# Patient Record
Sex: MALE | Race: WHITE | NOT HISPANIC OR LATINO | Employment: OTHER | URBAN - METROPOLITAN AREA
[De-identification: names, ages, dates, MRNs, and addresses within clinical notes are randomized per-mention and may not be internally consistent; named-entity substitution may affect disease eponyms.]

---

## 2023-07-14 ENCOUNTER — APPOINTMENT (OUTPATIENT)
Dept: CT IMAGING | Facility: CLINIC | Age: 63
End: 2023-07-14
Attending: PHYSICIAN ASSISTANT
Payer: COMMERCIAL

## 2023-07-14 ENCOUNTER — HOSPITAL ENCOUNTER (EMERGENCY)
Facility: CLINIC | Age: 63
Discharge: HOME | End: 2023-07-14
Attending: PHYSICIAN ASSISTANT | Admitting: PHYSICIAN ASSISTANT
Payer: COMMERCIAL

## 2023-07-14 VITALS
HEART RATE: 86 BPM | HEIGHT: 72 IN | BODY MASS INDEX: 36.84 KG/M2 | WEIGHT: 272 LBS | SYSTOLIC BLOOD PRESSURE: 145 MMHG | RESPIRATION RATE: 20 BRPM | OXYGEN SATURATION: 97 % | DIASTOLIC BLOOD PRESSURE: 88 MMHG | TEMPERATURE: 98.1 F

## 2023-07-14 DIAGNOSIS — M54.2 NECK PAIN: ICD-10-CM

## 2023-07-14 DIAGNOSIS — D17.0 LIPOMA OF NECK: ICD-10-CM

## 2023-07-14 LAB
ANION GAP SERPL CALCULATED.3IONS-SCNC: 11 MMOL/L (ref 7–15)
BASOPHILS # BLD AUTO: 0.1 10E3/UL (ref 0–0.2)
BASOPHILS NFR BLD AUTO: 1 %
BUN SERPL-MCNC: 18.5 MG/DL (ref 8–23)
CALCIUM SERPL-MCNC: 9.1 MG/DL (ref 8.8–10.2)
CHLORIDE SERPL-SCNC: 104 MMOL/L (ref 98–107)
CREAT SERPL-MCNC: 0.74 MG/DL (ref 0.67–1.17)
DEPRECATED HCO3 PLAS-SCNC: 23 MMOL/L (ref 22–29)
EOSINOPHIL # BLD AUTO: 0.1 10E3/UL (ref 0–0.7)
EOSINOPHIL NFR BLD AUTO: 2 %
ERYTHROCYTE [DISTWIDTH] IN BLOOD BY AUTOMATED COUNT: 15.7 % (ref 10–15)
GFR SERPL CREATININE-BSD FRML MDRD: >90 ML/MIN/1.73M2
GLUCOSE SERPL-MCNC: 119 MG/DL (ref 70–99)
HCT VFR BLD AUTO: 45.2 % (ref 40–53)
HGB BLD-MCNC: 15.2 G/DL (ref 13.3–17.7)
IMM GRANULOCYTES # BLD: 0.1 10E3/UL
IMM GRANULOCYTES NFR BLD: 1 %
LYMPHOCYTES # BLD AUTO: 2.3 10E3/UL (ref 0.8–5.3)
LYMPHOCYTES NFR BLD AUTO: 37 %
MCH RBC QN AUTO: 28.6 PG (ref 26.5–33)
MCHC RBC AUTO-ENTMCNC: 33.6 G/DL (ref 31.5–36.5)
MCV RBC AUTO: 85 FL (ref 78–100)
MONOCYTES # BLD AUTO: 0.7 10E3/UL (ref 0–1.3)
MONOCYTES NFR BLD AUTO: 11 %
NEUTROPHILS # BLD AUTO: 3 10E3/UL (ref 1.6–8.3)
NEUTROPHILS NFR BLD AUTO: 48 %
NRBC # BLD AUTO: 0 10E3/UL
NRBC BLD AUTO-RTO: 0 /100
PLATELET # BLD AUTO: 283 10E3/UL (ref 150–450)
POTASSIUM SERPL-SCNC: 4.4 MMOL/L (ref 3.4–5.3)
RBC # BLD AUTO: 5.31 10E6/UL (ref 4.4–5.9)
SODIUM SERPL-SCNC: 138 MMOL/L (ref 136–145)
WBC # BLD AUTO: 6.3 10E3/UL (ref 4–11)

## 2023-07-14 PROCEDURE — 250N000009 HC RX 250: Performed by: PHYSICIAN ASSISTANT

## 2023-07-14 PROCEDURE — 85025 COMPLETE CBC W/AUTO DIFF WBC: CPT | Performed by: PHYSICIAN ASSISTANT

## 2023-07-14 PROCEDURE — 250N000013 HC RX MED GY IP 250 OP 250 PS 637: Performed by: PHYSICIAN ASSISTANT

## 2023-07-14 PROCEDURE — 71260 CT THORAX DX C+: CPT

## 2023-07-14 PROCEDURE — 99285 EMERGENCY DEPT VISIT HI MDM: CPT | Mod: 25 | Performed by: PHYSICIAN ASSISTANT

## 2023-07-14 PROCEDURE — 250N000011 HC RX IP 250 OP 636: Performed by: PHYSICIAN ASSISTANT

## 2023-07-14 PROCEDURE — 99284 EMERGENCY DEPT VISIT MOD MDM: CPT | Performed by: PHYSICIAN ASSISTANT

## 2023-07-14 PROCEDURE — 70491 CT SOFT TISSUE NECK W/DYE: CPT

## 2023-07-14 PROCEDURE — 80048 BASIC METABOLIC PNL TOTAL CA: CPT | Performed by: PHYSICIAN ASSISTANT

## 2023-07-14 PROCEDURE — 36415 COLL VENOUS BLD VENIPUNCTURE: CPT | Performed by: PHYSICIAN ASSISTANT

## 2023-07-14 RX ORDER — IOPAMIDOL 755 MG/ML
500 INJECTION, SOLUTION INTRAVASCULAR ONCE
Status: COMPLETED | OUTPATIENT
Start: 2023-07-14 | End: 2023-07-14

## 2023-07-14 RX ORDER — OXYCODONE HYDROCHLORIDE 5 MG/1
5 TABLET ORAL EVERY 6 HOURS PRN
Qty: 6 TABLET | Refills: 0 | Status: SHIPPED | OUTPATIENT
Start: 2023-07-14 | End: 2023-07-17

## 2023-07-14 RX ORDER — ACETAMINOPHEN 325 MG/1
975 TABLET ORAL ONCE
Status: COMPLETED | OUTPATIENT
Start: 2023-07-14 | End: 2023-07-14

## 2023-07-14 RX ORDER — CYCLOBENZAPRINE HCL 10 MG
10 TABLET ORAL ONCE
Status: COMPLETED | OUTPATIENT
Start: 2023-07-14 | End: 2023-07-14

## 2023-07-14 RX ORDER — CYCLOBENZAPRINE HCL 10 MG
10 TABLET ORAL 3 TIMES DAILY PRN
Qty: 10 TABLET | Refills: 0 | Status: SHIPPED | OUTPATIENT
Start: 2023-07-14 | End: 2023-07-20

## 2023-07-14 RX ADMIN — SODIUM CHLORIDE 65 ML: 9 INJECTION, SOLUTION INTRAVENOUS at 12:54

## 2023-07-14 RX ADMIN — CYCLOBENZAPRINE 10 MG: 10 TABLET, FILM COATED ORAL at 12:46

## 2023-07-14 RX ADMIN — IOPAMIDOL 100 ML: 755 INJECTION, SOLUTION INTRAVENOUS at 12:54

## 2023-07-14 RX ADMIN — ACETAMINOPHEN 975 MG: 325 TABLET, FILM COATED ORAL at 12:45

## 2023-07-14 ASSESSMENT — ACTIVITIES OF DAILY LIVING (ADL): ADLS_ACUITY_SCORE: 35

## 2023-07-14 NOTE — DISCHARGE INSTRUCTIONS
It was a pleasure working with you today!  I hope your condition improves rapidly!     As we discussed, I would recommend seeing a general surgeon in consultation regarding your lipoma.  They will examine you and review your CT scans to see if it is appropriate for removal at this point.  I placed a referral.  Please call and schedule that appointment.  In the interim, I would use a heating pad over the painful area for 20 minutes every 1-2 hours.  You can use Tylenol 1000 mg every 6 hours as needed for pain.  Try the muscle relaxer 1 tablet every 8 hours as needed.  You can use the oxycodone for severe breakthrough pain.  Do not drive for 8 hours after taking this medication, as it can cause impairment of your judgment.

## 2023-07-14 NOTE — ED TRIAGE NOTES
Reports waking up with right neck pain this morning.      Triage Assessment     Row Name 07/14/23 1156       Triage Assessment (Adult)    Airway WDL WDL       Respiratory WDL    Respiratory WDL WDL       Skin Circulation/Temperature WDL    Skin Circulation/Temperature WDL WDL       Cardiac WDL    Cardiac WDL WDL       Peripheral/Neurovascular WDL    Peripheral Neurovascular WDL WDL       Cognitive/Neuro/Behavioral WDL    Cognitive/Neuro/Behavioral WDL WDL

## 2023-07-14 NOTE — ED PROVIDER NOTES
History     Chief Complaint   Patient presents with     Neck Pain     HPI  You Abbott is a 63 year old male who presents for evaluation of right trapezius and neck discomfort starting this morning when he woke up.  No injury.  Denies any radicular symptoms into his right upper extremity.  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  He was offered a cervical fusion at that level 2 years ago, but decided not to proceed with that.  This pain has not changed at all.  He also has a known lipoma which he states started in the right trapezius area.  He was offered removal by an orthopedic surgeon according to his report in [place], but they said they would have to open him up essentially from the trapezius area all the way down to the right mid chest which scared the patient and he did not proceed.  Patient states that the lipomatous mass has increased in size.  He is concerned that something has ruptured or gone wrong.  His pain is currently rated 7 on a scale of 10.  Worse with any movement of the right upper extremity.  Denies any fevers or chills.  No nausea or vomiting.  No chest pain, dyspnea, cough, or palpitations.  No hemoptysis.    Allergies:  No Known Allergies    Problem List:    There are no problems to display for this patient.       Past Medical History:    No past medical history on file.    Past Surgical History:    No past surgical history on file.    Family History:    No family history on file.    Social History:  Marital Status:  Single [1]        Medications:    cyclobenzaprine (FLEXERIL) 10 MG tablet  oxyCODONE (ROXICODONE) 5 MG tablet          Review of Systems   All other systems reviewed and are negative.      Physical Exam   BP: (!) 160/105  Pulse: 74  Temp: 98.1  F (36.7  C)  Resp: 20  Height: 182.9 cm (6')  Weight: 123.4 kg (272 lb)  SpO2: 96 %      Physical Exam  Vitals and nursing note reviewed.   Constitutional:        General: He is not in acute distress.     Appearance: He is not diaphoretic.   HENT:      Head: Normocephalic and atraumatic.      Right Ear: External ear normal.      Left Ear: External ear normal.      Nose: Nose normal.      Mouth/Throat:      Pharynx: No oropharyngeal exudate.   Eyes:      General: No scleral icterus.        Right eye: No discharge.         Left eye: No discharge.      Conjunctiva/sclera: Conjunctivae normal.      Pupils: Pupils are equal, round, and reactive to light.   Neck:      Thyroid: No thyromegaly.      Comments: Patient with soft tissue swelling starting in the mid superior trapezius area and it appears to transgress across to the anterolateral neck.  It is soft and compressible.  Mildly tender.  No induration or fluctuance.  It measures roughly about 25 cm long x 8 cm wide.  Good carotid pulses bilateral.  Normal range of motion of the cervical spine.  No pain with axial loading.  No spinous process tenderness.  Upper extremities equal appropriate range of motion, strength, sensation, pulses, and DTRs.  Cardiovascular:      Rate and Rhythm: Normal rate and regular rhythm.      Heart sounds: Normal heart sounds. No murmur heard.  Pulmonary:      Effort: Pulmonary effort is normal. No respiratory distress.      Breath sounds: Normal breath sounds. No wheezing or rales.   Chest:      Chest wall: No tenderness.   Abdominal:      General: Bowel sounds are normal. There is no distension.      Palpations: Abdomen is soft. There is no mass.      Tenderness: There is no abdominal tenderness. There is no guarding or rebound.   Musculoskeletal:         General: No tenderness or deformity. Normal range of motion.      Cervical back: Normal range of motion and neck supple.   Lymphadenopathy:      Cervical: No cervical adenopathy.   Skin:     General: Skin is warm and dry.      Capillary Refill: Capillary refill takes less than 2 seconds.      Findings: No erythema or rash.   Neurological:       Mental Status: He is alert and oriented to person, place, and time.      Cranial Nerves: No cranial nerve deficit.   Psychiatric:         Behavior: Behavior normal.         Thought Content: Thought content normal.         ED Course                 Procedures              Critical Care time:  none               Results for orders placed or performed during the hospital encounter of 07/14/23 (from the past 24 hour(s))   CBC with platelets differential    Narrative    The following orders were created for panel order CBC with platelets differential.  Procedure                               Abnormality         Status                     ---------                               -----------         ------                     CBC with platelets and d...[[id number]]  Abnormal            Final result                 Please view results for these tests on the individual orders.   Basic metabolic panel   Result Value Ref Range    Sodium 138 136 - 145 mmol/L    Potassium 4.4 3.4 - 5.3 mmol/L    Chloride 104 98 - 107 mmol/L    Carbon Dioxide (CO2) 23 22 - 29 mmol/L    Anion Gap 11 7 - 15 mmol/L    Urea Nitrogen 18.5 8.0 - 23.0 mg/dL    Creatinine 0.74 0.67 - 1.17 mg/dL    Calcium 9.1 8.8 - 10.2 mg/dL    Glucose 119 (H) 70 - 99 mg/dL    GFR Estimate >90 >60 mL/min/1.73m2   CBC with platelets and differential   Result Value Ref Range    WBC Count 6.3 4.0 - 11.0 10e3/uL    RBC Count 5.31 4.40 - 5.90 10e6/uL    Hemoglobin 15.2 13.3 - 17.7 g/dL    Hematocrit 45.2 40.0 - 53.0 %    MCV 85 78 - 100 fL    MCH 28.6 26.5 - 33.0 pg    MCHC 33.6 31.5 - 36.5 g/dL    RDW 15.7 (H) 10.0 - 15.0 %    Platelet Count 283 150 - 450 10e3/uL    % Neutrophils 48 %    % Lymphocytes 37 %    % Monocytes 11 %    % Eosinophils 2 %    % Basophils 1 %    % Immature Granulocytes 1 %    NRBCs per 100 WBC 0 <1 /100    Absolute Neutrophils 3.0 1.6 - 8.3 10e3/uL    Absolute Lymphocytes 2.3 0.8 - 5.3 10e3/uL    Absolute Monocytes 0.7 0.0 - 1.3 10e3/uL    Absolute  Eosinophils 0.1 0.0 - 0.7 10e3/uL    Absolute Basophils 0.1 0.0 - 0.2 10e3/uL    Absolute Immature Granulocytes 0.1 <=0.4 10e3/uL    Absolute NRBCs 0.0 10e3/uL   CT Soft Tissue Neck w Contrast    Narrative    CT SCAN OF THE NECK WITH CONTRAST  7/14/2023 1:08 PM     HISTORY: Right lower neck/trapezius area large mass.    TECHNIQUE: Axial CT images of the neck following administration of  intravenous contrast with reformations. Radiation dose for this scan  was reduced using automated exposure control, adjustment of the mA  and/or kV according to patient size, or iterative reconstruction  technique. Isovue-370 IV.     COMPARISON: None available. Correlation is made with chest CT of same  day.     FINDINGS: There is partial visualization of a large well-circumscribed  fat-attenuation mass in the right shoulder region just deep to the  trapezius muscle and above the level of the scapula measuring  approximately 11.8 cm AP x 8.9 cm transverse x 7.4 cm craniocaudal.  This is incompletely imaged on this exam. No definite aggressive  features identified within the field of view. Please see separate  report from CT of the chest of same day for additional details.    Visualized orbits appear normal. Couple of small probable polyps or  retention cysts in the right maxillary sinus. Visualized paranasal  sinuses otherwise appear clear. The mastoid and middle ear cavities  appear clear. Relatively numerous prominent carious erosions as well  as multiple periapical lucencies of the mandibular teeth. The  maxillary teeth are absent. No definite concerning mass or  inflammatory process/fluid collection along the mucosal spaces of the  upper aerodigestive tract. Normal appearance of the  and  parapharyngeal spaces. Mildly prominent/borderline enlarged multiple  bilateral upper cervical lymph nodes in the level I and level II zones  primarily, nonspecific, probably physiologic/reactive. Normal  appearance of the parotid and  submandibular glands. Small hypodense  lesion in the left thyroid lobe measuring 5-6 mm without aggressive  features, not requiring follow-up by ACR imaging criteria. Mild  bilateral carotid bifurcation atherosclerosis. No definite  flow-limiting stenosis of the cervical carotid arteries. The cervical  vertebral arteries appear to be grossly patent, accounting for  technique.    Please see separate report from CT chest of the same session for  details regarding lung/mediastinal findings. Multilevel degenerative  changes are seen in the cervical and upper thoracic spine.      Impression    IMPRESSION:  1. Partial visualization of a large fat-attenuation mass measuring  over 10 cm in the right shoulder region deep to the trapezius muscle.  No definitive aggressive features on CT. This may represent a large  lipoma. However, given the size of the lesion, atypical lipomatous  tumor/well-differentiated liposarcoma cannot be completely excluded.  2. Multiple carious erosions and periapical lucencies concerning for  periapical abscesses involving the mandibular teeth.  3. Mildly prominent/borderline enlarged upper cervical lymph nodes,  likely physiologic/reactive.  4. Other incidental findings, as described.  5. Please refer to the separate report from CT of the chest of same  day for additional details.   CT CHEST W CONTRAST    Narrative    CT CHEST WITH CONTRAST  7/14/2023 1:10 PM    CLINICAL HISTORY: Right lower neck/trapezius/upper chest area large  mass.    TECHNIQUE: CT chest with IV contrast. Multiplanar reformats were  obtained. Dose reduction techniques were used.  CONTRAST: Isovue-370, 100 mL    COMPARISON: None.    FINDINGS:   LUNGS AND PLEURA: Lungs are clear. No pleural effusion.    MEDIASTINUM/AXILLAE: No lymphadenopathy. No thoracic aneurysm.    CORONARY ARTERY CALCIFICATION: Mild.    UPPER ABDOMEN: Hepatic and renal cysts without specific imaging  follow-up recommended.    MUSCULOSKELETAL: Deep to the  right trapezius is a simple-appearing  lipoma measuring 8.5 x 8.2 cm in transverse plane, series 3 image 1.  No aggressive-appearing bone lesion identified. Spine degenerative  changes.      Impression    IMPRESSION:   1.  Prominent lipoma deep to the right trapezius muscle.  2.  No acute abnormality identified in the chest.    MARCELLA GASTON MD         SYSTEM ID:  [id number]       Medications   cyclobenzaprine (FLEXERIL) tablet 10 mg (10 mg Oral $Given 7/14/23 1246)   acetaminophen (TYLENOL) tablet 975 mg (975 mg Oral $Given 7/14/23 1245)   iopamidol (ISOVUE-370) solution 500 mL (100 mLs Intravenous $Given 7/14/23 1254)   sodium chloride 0.9 % bag 100ml for CT scan flush use (65 mLs As instructed $Given 7/14/23 1254)       Assessments & Plan (with Medical Decision Making)     Lipoma of neck  Neck pain     63 year old male who presents for evaluation of right soft tissue trapezius and neck area swelling/tenderness increased since this morning.  Has a known lipomatous mass according to the patient discovered in [place] years ago.  He feels that it is greatly increased in size.  It generally does not cause him pain, but he is having pain in this region since this morning without related injury.  No fever or chills.  No nausea or vomiting.  See HPI above for details.  On exam blood pressure 160/105, temperature 98.1, pulse 74, respiration 20, oxygen saturation 96% on room air.  Patient without spinous process tenderness of the cervical or thoracic spine.  He does have soft tissue swelling at the level of the mid superior trapezius across the anterolateral neck measuring roughly 25 x 8 cm.  No induration or fluctuance.  No erythema.  No increased warmth.  Mildly tender to palpation.  Cardiopulmonary exam normal.  Upper extremities with thick and appropriate range of motion, strength, sensation, DTRs, and pulses.  Patient was given p.o. Tylenol and p.o. Flexeril for symptom management while he was here in the emergency  department.  IV was established.  Laboratory levels displayed no acute abnormality with CBC and basic metabolic panel.  CT of the chest and neck with a large lipomatous mass.  I performed personal review of the images as well.  I see that the lipoma does go underneath the trapezius muscle, but does extend more in the subcutaneous space along the lateral neck area.  No invasion.  Appears to be benign in nature.  Other benign findings as noted above.  I consulted with general surgery on-call, Dr. Hernandez, and he felt it was appropriate for follow-up in the general surgery clinic to determine if this would be a mass that could be cared for locally.  Ultimately, the patient is having increased pain due to the mass, and it likely should be removed.  In the interim, we will have the patient use Tylenol.  Flexeril as needed for muscle spasm which she is experiencing in the trapezius muscle.  He did feel better here with that.  He also wondered if he could get something more for pain.  I did give him 6 tablets of oxycodone for breakthrough pain.  No driving for 8 hours after taking the medication.  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  Encouraged him to start his home physical therapy exercises again.  Indications for ED return reviewed with the patient.  He was in agreement with this plan and was suitable for discharge.     I have reviewed the nursing notes.    I have reviewed the findings, diagnosis, plan and need for follow up with the patient.           Medical Decision Making  The patient's presentation was of moderate complexity (an acute illness with systemic symptoms).    The patient's evaluation involved:  ordering and/or review of 3+ test(s) in this encounter (see separate area of note for details)  discussion of management or test interpretation with another health professional (see separate area of note for details)    The patient's management necessitated moderate risk (prescription drug  management including medications given in the ED).        New Prescriptions    CYCLOBENZAPRINE (FLEXERIL) 10 MG TABLET    Take 1 tablet (10 mg) by mouth 3 times daily as needed for muscle spasms    OXYCODONE (ROXICODONE) 5 MG TABLET    Take 1 tablet (5 mg) by mouth every 6 hours as needed for pain       Final diagnoses:   Lipoma of neck   Neck pain     Disclaimer: This note consists of symbols derived from keyboarding, dictation and/or voice recognition software. As a result, there may be errors in the script that have gone undetected. Please consider this when interpreting information found in this chart.      7/14/2023   [facility] EMERGENCY DEPT     Gary Cordon PA-C  07/14/23 7570

## 2023-07-31 ENCOUNTER — OFFICE VISIT (OUTPATIENT)
Dept: SURGERY | Facility: CLINIC | Age: 63
End: 2023-07-31
Attending: PHYSICIAN ASSISTANT
Payer: COMMERCIAL

## 2023-07-31 VITALS
SYSTOLIC BLOOD PRESSURE: 132 MMHG | HEIGHT: 72 IN | DIASTOLIC BLOOD PRESSURE: 72 MMHG | WEIGHT: 279 LBS | BODY MASS INDEX: 37.79 KG/M2 | TEMPERATURE: 97.6 F

## 2023-07-31 DIAGNOSIS — D17.0 LIPOMA OF NECK: Primary | ICD-10-CM

## 2023-07-31 DIAGNOSIS — D17.1 LIPOMA OF BACK: ICD-10-CM

## 2023-07-31 PROCEDURE — 99244 OFF/OP CNSLTJ NEW/EST MOD 40: CPT | Performed by: SPECIALIST

## 2023-07-31 ASSESSMENT — PAIN SCALES - GENERAL: PAINLEVEL: SEVERE PAIN (6)

## 2023-07-31 NOTE — LETTER
7/31/2023         RE: You Abbott  [address]  [facility] [place] [address]        Dear Colleague,    Thank you for referring your patient, You Abbott, to the [facility]. Please see a copy of my visit note below.    Consult requested by Gary hernandes    Reason for consultation: Lipoma of the neck/back.    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  This was done for an evaluation of his spine.  Its been slowly enlarging over that time and now he is thinking he like to get something done about it.  He plan to do something several years ago when it was about 5 cm in size but COVID interrupted that.  He does have known chronic neck pain and DJD.  He was seen in the ER and subsequent CT there revealed a large mass most likely lipoma.  Denies any fevers chills drainage from the site.  He now presents to me for excision.    No past medical history on file.    No past surgical history on file.    No current outpatient medications on file.     No current facility-administered medications for this visit.      No Known Allergies    Social History     Socioeconomic History     Marital status: Single     Spouse name: Not on file     Number of children: Not on file     Years of education: Not on file     Highest education level: Not on file   Occupational History     Not on file   Tobacco Use     Smoking status: Every Day     Types: Cigarettes     Smokeless tobacco: Current   Substance and Sexual Activity     Alcohol use: Yes     Drug use: Never     Sexual activity: Not on file   Other Topics Concern     Not on file   Social History Narrative     Not on file     Social Determinants of Health     Financial Resource Strain: Not on file   Food Insecurity: Not on file   Transportation Needs: Not on file   Physical Activity: Not on file   Stress: Not on file   Social Connections: Not on file   Intimate Partner Violence: Not on  file   Housing Stability: Not on file     No family history on file.     ROS: 10 point ROS neg other than the symptoms noted above in the HPI.    PE:  B/P: 132/72, T: 97.6, P: Data Unavailable, R: Data Unavailable  General: well developed, well nourished WM who appears their stated age  HEENT: NC/AT, EOMI, (-)icterus, (-)injection  Neck: Supple, No JVD, 20 x 5 cm mass extending from shoulder down the back, mobile, nontender.  No neuro symptoms with manipulation.  Chest: CTA  Heart: S1, S2, (-)m/r/g  Abd: Soft, non tender, non distended, non tender, no masses  Ext; Warm, no edema  Psych: AAOx3  Neuro: No focal deficits    CT -   CT CHEST WITH CONTRAST  7/14/2023 1:10 PM     CLINICAL HISTORY: Right lower neck/trapezius/upper chest area large  mass.     TECHNIQUE: CT chest with IV contrast. Multiplanar reformats were  obtained. Dose reduction techniques were used.  CONTRAST: Isovue-370, 100 mL     COMPARISON: None.     FINDINGS:   LUNGS AND PLEURA: Lungs are clear. No pleural effusion.     MEDIASTINUM/AXILLAE: No lymphadenopathy. No thoracic aneurysm.     CORONARY ARTERY CALCIFICATION: Mild.     UPPER ABDOMEN: Hepatic and renal cysts without specific imaging  follow-up recommended.     MUSCULOSKELETAL: Deep to the right trapezius is a simple-appearing  lipoma measuring 8.5 x 8.2 cm in transverse plane, series 3 image 1.  No aggressive-appearing bone lesion identified. Spine degenerative  changes.                                                                      IMPRESSION:   1.  Prominent lipoma deep to the right trapezius muscle.  2.  No acute abnormality identified in the chest.     MARCELLA GASTON MD      CT SCAN OF THE NECK WITH CONTRAST  7/14/2023 1:08 PM      HISTORY: Right lower neck/trapezius area large mass.     TECHNIQUE: Axial CT images of the neck following administration of  intravenous contrast with reformations. Radiation dose for this scan  was reduced using automated exposure control, adjustment of the  mA  and/or kV according to patient size, or iterative reconstruction  technique. Isovue-370 IV.      COMPARISON: None available. Correlation is made with chest CT of same  day.      FINDINGS: There is partial visualization of a large well-circumscribed  fat-attenuation mass in the right shoulder region just deep to the  trapezius muscle and above the level of the scapula measuring  approximately 11.8 cm AP x 8.9 cm transverse x 7.4 cm craniocaudal.  This is incompletely imaged on this exam. No definite aggressive  features identified within the field of view. Please see separate  report from CT of the chest of same day for additional details.     Visualized orbits appear normal. Couple of small probable polyps or  retention cysts in the right maxillary sinus. Visualized paranasal  sinuses otherwise appear clear. The mastoid and middle ear cavities  appear clear. Relatively numerous prominent carious erosions as well  as multiple periapical lucencies of the mandibular teeth. The  maxillary teeth are absent. No definite concerning mass or  inflammatory process/fluid collection along the mucosal spaces of the  upper aerodigestive tract. Normal appearance of the  and  parapharyngeal spaces. Mildly prominent/borderline enlarged multiple  bilateral upper cervical lymph nodes in the level I and level II zones  primarily, nonspecific, probably physiologic/reactive. Normal  appearance of the parotid and submandibular glands. Small hypodense  lesion in the left thyroid lobe measuring 5-6 mm without aggressive  features, not requiring follow-up by ACR imaging criteria. Mild  bilateral carotid bifurcation atherosclerosis. No definite  flow-limiting stenosis of the cervical carotid arteries. The cervical  vertebral arteries appear to be grossly patent, accounting for  technique.     Please see separate report from CT chest of the same session for  details regarding lung/mediastinal findings. Multilevel  degenerative  changes are seen in the cervical and upper thoracic spine.                                                                      IMPRESSION:  1. Partial visualization of a large fat-attenuation mass measuring  over 10 cm in the right shoulder region deep to the trapezius muscle.  No definitive aggressive features on CT. This may represent a large  lipoma. However, given the size of the lesion, atypical lipomatous  tumor/well-differentiated liposarcoma cannot be completely excluded.  2. Multiple carious erosions and periapical lucencies concerning for  periapical abscesses involving the mandibular teeth.  3. Mildly prominent/borderline enlarged upper cervical lymph nodes,  likely physiologic/reactive.  4. Other incidental findings, as described.  5. Please refer to the separate report from CT of the chest of same  day for additional details.     JEB LEIVA MD     Impression/plan:  This is a 63-year-old gentleman with a longstanding neck/back mass that has been slowly enlarging over about 10 years that most likely represents a lipoma.  It is smooth and well-circumscribed.  It is freely mobile on exam..  I did review the imaging and it does appear to be a lipoma.  I feel it is amenable to removal under general anesthesia.  After discussion with the patient the plan at this time is removed under general anesthesia.   The procedure, risks (bleeding, infection, nerve injury), benefits, and alternatives were discussed and the patient agrees to proceed.            Again, thank you for allowing me to participate in the care of your patient.        Sincerely,        Malcolm Gann MD

## 2023-07-31 NOTE — PROGRESS NOTES
Consult requested by Gary hernandes    Reason for consultation: Lipoma of the neck/back.    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  This was done for an evaluation of his spine.  Its been slowly enlarging over that time and now he is thinking he like to get something done about it.  He plan to do something several years ago when it was about 5 cm in size but COVID interrupted that.  He does have known chronic neck pain and DJD.  He was seen in the ER and subsequent CT there revealed a large mass most likely lipoma.  Denies any fevers chills drainage from the site.  He now presents to me for excision.    No past medical history on file.    No past surgical history on file.    No current outpatient medications on file.     No current facility-administered medications for this visit.      No Known Allergies    Social History     Socioeconomic History    Marital status: Single     Spouse name: Not on file    Number of children: Not on file    Years of education: Not on file    Highest education level: Not on file   Occupational History    Not on file   Tobacco Use    Smoking status: Every Day     Types: Cigarettes    Smokeless tobacco: Current   Substance and Sexual Activity    Alcohol use: Yes    Drug use: Never    Sexual activity: Not on file   Other Topics Concern    Not on file   Social History Narrative    Not on file     Social Determinants of Health     Financial Resource Strain: Not on file   Food Insecurity: Not on file   Transportation Needs: Not on file   Physical Activity: Not on file   Stress: Not on file   Social Connections: Not on file   Intimate Partner Violence: Not on file   Housing Stability: Not on file     No family history on file.     ROS: 10 point ROS neg other than the symptoms noted above in the HPI.    PE:  B/P: 132/72, T: 97.6, P: Data Unavailable, R: Data Unavailable  General: well developed, well nourished WM  who appears their stated age  HEENT: NC/AT, EOMI, (-)icterus, (-)injection  Neck: Supple, No JVD, 20 x 5 cm mass extending from shoulder down the back, mobile, nontender.  No neuro symptoms with manipulation.  Chest: CTA  Heart: S1, S2, (-)m/r/g  Abd: Soft, non tender, non distended, non tender, no masses  Ext; Warm, no edema  Psych: AAOx3  Neuro: No focal deficits    CT -   CT CHEST WITH CONTRAST  7/14/2023 1:10 PM     CLINICAL HISTORY: Right lower neck/trapezius/upper chest area large  mass.     TECHNIQUE: CT chest with IV contrast. Multiplanar reformats were  obtained. Dose reduction techniques were used.  CONTRAST: Isovue-370, 100 mL     COMPARISON: None.     FINDINGS:   LUNGS AND PLEURA: Lungs are clear. No pleural effusion.     MEDIASTINUM/AXILLAE: No lymphadenopathy. No thoracic aneurysm.     CORONARY ARTERY CALCIFICATION: Mild.     UPPER ABDOMEN: Hepatic and renal cysts without specific imaging  follow-up recommended.     MUSCULOSKELETAL: Deep to the right trapezius is a simple-appearing  lipoma measuring 8.5 x 8.2 cm in transverse plane, series 3 image 1.  No aggressive-appearing bone lesion identified. Spine degenerative  changes.                                                                      IMPRESSION:   1.  Prominent lipoma deep to the right trapezius muscle.  2.  No acute abnormality identified in the chest.     MARCELLA GASTON MD      CT SCAN OF THE NECK WITH CONTRAST  7/14/2023 1:08 PM      HISTORY: Right lower neck/trapezius area large mass.     TECHNIQUE: Axial CT images of the neck following administration of  intravenous contrast with reformations. Radiation dose for this scan  was reduced using automated exposure control, adjustment of the mA  and/or kV according to patient size, or iterative reconstruction  technique. Isovue-370 IV.      COMPARISON: None available. Correlation is made with chest CT of same  day.      FINDINGS: There is partial visualization of a large  well-circumscribed  fat-attenuation mass in the right shoulder region just deep to the  trapezius muscle and above the level of the scapula measuring  approximately 11.8 cm AP x 8.9 cm transverse x 7.4 cm craniocaudal.  This is incompletely imaged on this exam. No definite aggressive  features identified within the field of view. Please see separate  report from CT of the chest of same day for additional details.     Visualized orbits appear normal. Couple of small probable polyps or  retention cysts in the right maxillary sinus. Visualized paranasal  sinuses otherwise appear clear. The mastoid and middle ear cavities  appear clear. Relatively numerous prominent carious erosions as well  as multiple periapical lucencies of the mandibular teeth. The  maxillary teeth are absent. No definite concerning mass or  inflammatory process/fluid collection along the mucosal spaces of the  upper aerodigestive tract. Normal appearance of the  and  parapharyngeal spaces. Mildly prominent/borderline enlarged multiple  bilateral upper cervical lymph nodes in the level I and level II zones  primarily, nonspecific, probably physiologic/reactive. Normal  appearance of the parotid and submandibular glands. Small hypodense  lesion in the left thyroid lobe measuring 5-6 mm without aggressive  features, not requiring follow-up by ACR imaging criteria. Mild  bilateral carotid bifurcation atherosclerosis. No definite  flow-limiting stenosis of the cervical carotid arteries. The cervical  vertebral arteries appear to be grossly patent, accounting for  technique.     Please see separate report from CT chest of the same session for  details regarding lung/mediastinal findings. Multilevel degenerative  changes are seen in the cervical and upper thoracic spine.                                                                      IMPRESSION:  1. Partial visualization of a large fat-attenuation mass measuring  over 10 cm in the right  shoulder region deep to the trapezius muscle.  No definitive aggressive features on CT. This may represent a large  lipoma. However, given the size of the lesion, atypical lipomatous  tumor/well-differentiated liposarcoma cannot be completely excluded.  2. Multiple carious erosions and periapical lucencies concerning for  periapical abscesses involving the mandibular teeth.  3. Mildly prominent/borderline enlarged upper cervical lymph nodes,  likely physiologic/reactive.  4. Other incidental findings, as described.  5. Please refer to the separate report from CT of the chest of same  day for additional details.     JEB LEIVA MD     Impression/plan:  This is a 63-year-old gentleman with a longstanding neck/back mass that has been slowly enlarging over about 10 years that most likely represents a lipoma.  It is smooth and well-circumscribed.  It is freely mobile on exam..  I did review the imaging and it does appear to be a lipoma.  I feel it is amenable to removal under general anesthesia.  After discussion with the patient the plan at this time is removed under general anesthesia.   The procedure, risks (bleeding, infection, nerve injury), benefits, and alternatives were discussed and the patient agrees to proceed.

## 2023-08-01 ENCOUNTER — TELEPHONE (OUTPATIENT)
Dept: SURGERY | Facility: CLINIC | Age: 63
End: 2023-08-01
Payer: COMMERCIAL

## 2023-08-01 NOTE — TELEPHONE ENCOUNTER
Spoke to patient, he needs some time to check his schedule. He will call back to schedule surgery when he is ready.

## 2023-08-07 ENCOUNTER — TELEPHONE (OUTPATIENT)
Dept: SURGERY | Facility: CLINIC | Age: 63
End: 2023-08-07

## 2023-08-07 ENCOUNTER — OFFICE VISIT (OUTPATIENT)
Dept: FAMILY MEDICINE | Facility: CLINIC | Age: 63
End: 2023-08-07
Payer: COMMERCIAL

## 2023-08-07 VITALS
HEART RATE: 76 BPM | HEIGHT: 70 IN | DIASTOLIC BLOOD PRESSURE: 84 MMHG | TEMPERATURE: 97.7 F | BODY MASS INDEX: 40.09 KG/M2 | WEIGHT: 280 LBS | OXYGEN SATURATION: 98 % | SYSTOLIC BLOOD PRESSURE: 138 MMHG | RESPIRATION RATE: 16 BRPM

## 2023-08-07 DIAGNOSIS — G47.33 OBSTRUCTIVE SLEEP APNEA SYNDROME: ICD-10-CM

## 2023-08-07 DIAGNOSIS — R68.82 LOW LIBIDO: ICD-10-CM

## 2023-08-07 DIAGNOSIS — F41.1 GENERALIZED ANXIETY DISORDER: ICD-10-CM

## 2023-08-07 DIAGNOSIS — Z00.00 ROUTINE GENERAL MEDICAL EXAMINATION AT A HEALTH CARE FACILITY: Primary | ICD-10-CM

## 2023-08-07 DIAGNOSIS — Z87.898 HISTORY OF PREDIABETES: ICD-10-CM

## 2023-08-07 DIAGNOSIS — F33.41 RECURRENT MAJOR DEPRESSIVE DISORDER, IN PARTIAL REMISSION (H): ICD-10-CM

## 2023-08-07 DIAGNOSIS — E66.01 MORBID OBESITY (H): ICD-10-CM

## 2023-08-07 DIAGNOSIS — R63.5 WEIGHT GAIN: ICD-10-CM

## 2023-08-07 DIAGNOSIS — L98.9 SKIN LESION: ICD-10-CM

## 2023-08-07 DIAGNOSIS — N52.9 ERECTILE DYSFUNCTION, UNSPECIFIED ERECTILE DYSFUNCTION TYPE: ICD-10-CM

## 2023-08-07 DIAGNOSIS — Z72.0 TOBACCO ABUSE DISORDER: ICD-10-CM

## 2023-08-07 DIAGNOSIS — D17.30 LIPOMA OF SKIN AND SUBCUTANEOUS TISSUE: ICD-10-CM

## 2023-08-07 DIAGNOSIS — R79.89 LOW TESTOSTERONE IN MALE: ICD-10-CM

## 2023-08-07 LAB
ALBUMIN SERPL BCG-MCNC: 4.3 G/DL (ref 3.5–5.2)
ALP SERPL-CCNC: 97 U/L (ref 40–129)
ALT SERPL W P-5'-P-CCNC: 23 U/L (ref 0–70)
ANION GAP SERPL CALCULATED.3IONS-SCNC: 10 MMOL/L (ref 7–15)
AST SERPL W P-5'-P-CCNC: 16 U/L (ref 0–45)
BILIRUB SERPL-MCNC: 0.5 MG/DL
BUN SERPL-MCNC: 15.9 MG/DL (ref 8–23)
CALCIUM SERPL-MCNC: 9.1 MG/DL (ref 8.8–10.2)
CHLORIDE SERPL-SCNC: 104 MMOL/L (ref 98–107)
CHOLEST SERPL-MCNC: 181 MG/DL
CREAT SERPL-MCNC: 0.74 MG/DL (ref 0.67–1.17)
DEPRECATED HCO3 PLAS-SCNC: 24 MMOL/L (ref 22–29)
GFR SERPL CREATININE-BSD FRML MDRD: >90 ML/MIN/1.73M2
GLUCOSE SERPL-MCNC: 119 MG/DL (ref 70–99)
HBA1C MFR BLD: 5.6 %
HDLC SERPL-MCNC: 46 MG/DL
LDLC SERPL CALC-MCNC: 112 MG/DL
NONHDLC SERPL-MCNC: 135 MG/DL
POTASSIUM SERPL-SCNC: 4.4 MMOL/L (ref 3.4–5.3)
PROT SERPL-MCNC: 7.1 G/DL (ref 6.4–8.3)
PSA SERPL DL<=0.01 NG/ML-MCNC: 5.95 NG/ML (ref 0–4.5)
SODIUM SERPL-SCNC: 138 MMOL/L (ref 136–145)
TRIGL SERPL-MCNC: 114 MG/DL
TSH SERPL DL<=0.005 MIU/L-ACNC: 2.46 UIU/ML (ref 0.3–4.2)

## 2023-08-07 PROCEDURE — G0103 PSA SCREENING: HCPCS | Performed by: FAMILY MEDICINE

## 2023-08-07 PROCEDURE — 83036 HEMOGLOBIN GLYCOSYLATED A1C: CPT | Performed by: FAMILY MEDICINE

## 2023-08-07 PROCEDURE — 36415 COLL VENOUS BLD VENIPUNCTURE: CPT | Performed by: FAMILY MEDICINE

## 2023-08-07 PROCEDURE — 99386 PREV VISIT NEW AGE 40-64: CPT | Mod: 25 | Performed by: FAMILY MEDICINE

## 2023-08-07 PROCEDURE — 90471 IMMUNIZATION ADMIN: CPT | Performed by: FAMILY MEDICINE

## 2023-08-07 PROCEDURE — 80053 COMPREHEN METABOLIC PANEL: CPT | Performed by: FAMILY MEDICINE

## 2023-08-07 PROCEDURE — 90472 IMMUNIZATION ADMIN EACH ADD: CPT | Performed by: FAMILY MEDICINE

## 2023-08-07 PROCEDURE — 90750 HZV VACC RECOMBINANT IM: CPT | Performed by: FAMILY MEDICINE

## 2023-08-07 PROCEDURE — 90677 PCV20 VACCINE IM: CPT | Performed by: FAMILY MEDICINE

## 2023-08-07 PROCEDURE — 87389 HIV-1 AG W/HIV-1&-2 AB AG IA: CPT | Performed by: FAMILY MEDICINE

## 2023-08-07 PROCEDURE — 90715 TDAP VACCINE 7 YRS/> IM: CPT | Performed by: FAMILY MEDICINE

## 2023-08-07 PROCEDURE — 84403 ASSAY OF TOTAL TESTOSTERONE: CPT | Performed by: FAMILY MEDICINE

## 2023-08-07 PROCEDURE — 84443 ASSAY THYROID STIM HORMONE: CPT | Performed by: FAMILY MEDICINE

## 2023-08-07 PROCEDURE — 80061 LIPID PANEL: CPT | Performed by: FAMILY MEDICINE

## 2023-08-07 PROCEDURE — 84270 ASSAY OF SEX HORMONE GLOBUL: CPT | Performed by: FAMILY MEDICINE

## 2023-08-07 PROCEDURE — 99214 OFFICE O/P EST MOD 30 MIN: CPT | Mod: 25 | Performed by: FAMILY MEDICINE

## 2023-08-07 PROCEDURE — 86803 HEPATITIS C AB TEST: CPT | Performed by: FAMILY MEDICINE

## 2023-08-07 RX ORDER — BUSPIRONE HYDROCHLORIDE 5 MG/1
5 TABLET ORAL 2 TIMES DAILY
Qty: 60 TABLET | Refills: 0 | Status: SHIPPED | OUTPATIENT
Start: 2023-08-07 | End: 2024-01-30

## 2023-08-07 RX ORDER — SILDENAFIL 50 MG/1
50 TABLET, FILM COATED ORAL DAILY PRN
Qty: 10 TABLET | Refills: 1 | Status: SHIPPED | OUTPATIENT
Start: 2023-08-07

## 2023-08-07 RX ORDER — ESCITALOPRAM OXALATE 10 MG/1
10 TABLET ORAL DAILY
Qty: 30 TABLET | Refills: 0 | Status: SHIPPED | OUTPATIENT
Start: 2023-08-07 | End: 2023-09-08

## 2023-08-07 ASSESSMENT — PATIENT HEALTH QUESTIONNAIRE - PHQ9
SUM OF ALL RESPONSES TO PHQ QUESTIONS 1-9: 8
10. IF YOU CHECKED OFF ANY PROBLEMS, HOW DIFFICULT HAVE THESE PROBLEMS MADE IT FOR YOU TO DO YOUR WORK, TAKE CARE OF THINGS AT HOME, OR GET ALONG WITH OTHER PEOPLE: SOMEWHAT DIFFICULT
SUM OF ALL RESPONSES TO PHQ QUESTIONS 1-9: 8

## 2023-08-07 ASSESSMENT — ENCOUNTER SYMPTOMS
DIARRHEA: 0
MYALGIAS: 0
CONSTIPATION: 0
HEMATURIA: 0
HEMATOCHEZIA: 0
EYE PAIN: 0
SORE THROAT: 0
WEAKNESS: 0
FREQUENCY: 0
ABDOMINAL PAIN: 0
JOINT SWELLING: 1
CHILLS: 0
PALPITATIONS: 0
DIZZINESS: 0
NERVOUS/ANXIOUS: 1
FEVER: 0
NAUSEA: 0
DYSURIA: 0
HEADACHES: 0
HEARTBURN: 0
SHORTNESS OF BREATH: 0
COUGH: 0
ARTHRALGIAS: 1
PARESTHESIAS: 0

## 2023-08-07 NOTE — PROGRESS NOTES
Prior to immunization administration, verified patients identity using patient s name and date of birth. Please see Immunization Activity for additional information.     Screening Questionnaire for Adult Immunization    Are you sick today?   No   Do you have allergies to medications, food, a vaccine component or latex?   No   Have you ever had a serious reaction after receiving a vaccination?   No   Do you have a long-term health problem with heart, lung, kidney, or metabolic disease (e.g., diabetes), asthma, a blood disorder, no spleen, complement component deficiency, a cochlear implant, or a spinal fluid leak?  Are you on long-term aspirin therapy?   No   Do you have cancer, leukemia, HIV/AIDS, or any other immune system problem?   No   Do you have a parent, brother, or sister with an immune system problem?   No   In the past 3 months, have you taken medications that affect  your immune system, such as prednisone, other steroids, or anticancer drugs; drugs for the treatment of rheumatoid arthritis, Crohn s disease, or psoriasis; or have you had radiation treatments?   No   Have you had a seizure, or a brain or other nervous system problem?   No   During the past year, have you received a transfusion of blood or blood    products, or been given immune (gamma) globulin or antiviral drug?   No   For women: Are you pregnant or is there a chance you could become       pregnant during the next month?   No   Have you received any vaccinations in the past 4 weeks?   No     Immunization questionnaire answers were all negative.      Patient instructed to remain in clinic for 15 minutes afterwards, and to report any adverse reactions.     Screening performed by Dawn Stacy CMA on 8/7/2023 at 8:23 AM.

## 2023-08-07 NOTE — PROGRESS NOTES
SUBJECTIVE:   CC: Liang is an 63 year old who presents for preventative health visit.       8/7/2023     7:07 AM   Additional Questions   Roomed by Dawn Stacy       Healthy Habits:     Getting at least 3 servings of Calcium per day:  Yes    Bi-annual eye exam:  Yes    Dental care twice a year:  NO    Sleep apnea or symptoms of sleep apnea:  None    Diet:  Regular (no restrictions)    Frequency of exercise:  None    Taking medications regularly:  Not Applicable    Medication side effects:  Not applicable    Additional concerns today:  No      Today's PHQ-9 Score:       8/7/2023     7:00 AM   PHQ-9 SCORE   PHQ-9 Total Score [place] 8 (Mild depression)   PHQ-9 Total Score 8     You is here today for physical, establish care and general follow-up.  He is new to the clinic, recently moved to from [place].    1.  He has a mass on the back of his neck that been there for years.  It is getting bigger and causing neck stiffness/pain and headache.  Saw surgery recently and diagnosed with lipoma.  He is scheduled to have its removed in the near future.  Not sure if he will need a preop physical.  No personal or family history of anesthesia complication.  No history of blood transfusion and is okay to be transfused if necessary.  No personal or family history of premature CAD, diabetes, hypertension, CVA or PAD.  No asthma or COPD.  Been a smoker for more than 50 years, average about a pack a day.  No wheezing or chest tightness sensation.  No breathing concerns    2.  Also has a lesion on the right nose been there for about a year.  It is getting bigger and has not healed completely for over a year.  Not crusted.  No pain or change its color  - has been picking on it.  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    3.  Stated that he has problem with his weight for most of his life.  Was treated with Topamax and phentermine for 3 months when he was  in [place] and he lost significant amount of weight from it.  No side effect.  Been gain most of the weight since stopped taking it.  In fact, he has gained about 30 in th e last in the last 6 months since moving to [place] and pen[facility].  Has not been doing much - not exercising.  His work was very labor extensive - miss it a lot.  No history of thyroid problem.  He is interest to be back on the medication.    4.  Next concern is about ED and low libido.  His libido has been low for years has it has gotten worse.  He is careless about intimacy and in fact he has not been sexually active for at least couple years.  Not sure if he is able to get erection anymore.  It is affecting his relationship.  Would like to try Viagra and to be further evaluated for testosterone.  Was told that his testosterone was low several years ago but never been treated for it.  No history of thyroid problem.  Never tried Viagra before.     5.  Also known to have depression and anxiety for years which attributed to the stress.  Was on Prozac and Wellbutrin but did not like the side effects, especially the Wellbutrin.  Been off the medications for years - learned to live with them.  Been having more stress since moving to [place] and his penitentiary.  His depression and anxiety have gotten worse and they are effecting his life.  No mood swing and never been diagnosed or treated for mood disorder.  Been having a lot of anxiety with anxiety attacks.  No suicidal or homicidal ideation.  No hallucination.  No drugs or excessive alcohol intake.  No problem with sleeping.  Would like to try something for them.  He feels he is mainly affected by the anxiety, not much of the depression.    6.  Continues to smoke about 1 ppd, not ready to quit.  Been a smoker for about 50 years, average about a pack per day.  Not ready to quit.  Stated that he was diagnosed with sleep apnea about 15 years ago and was on the CPAP.  Did not tolerate the CPAP and  has not used it since then.  Continue to have problems with excessive daytime fatigue and sleepiness.    7.  No major medical care or procedure done since the last physical several years ago.  No headache, dizziness or acute visual change. No runny nose, nasal congestion, coughing, fever or chill. No CP/SOB. No N/V/D/C or problem with urination - no nocturia.  No abdominal pain.  No leg swelling or pain.  Not exercising. Social drinker but no drug use.  Feels safe, lives with his significant other.  No other concern today       Have you ever done Advance Care Planning? (For example, a Health Directive, POLST, or a discussion with a medical provider or your loved ones about your wishes): No, advance care planning information given to patient to review.  Advanced care planning was discussed at today's visit.    Social History     Tobacco Use     Smoking status: Every Day     Packs/day: 1.00     Types: Cigarettes     Smokeless tobacco: Current     Tobacco comments:     Used to smoke up to 2 ppd. Smoking since 18 yo   Substance Use Topics     Alcohol use: Yes     Comment: couple a week - 3-4 beers each time             8/7/2023     6:58 AM   Alcohol Use   Prescreen: >3 drinks/day or >7 drinks/week? Yes   AUDIT SCORE  9         Last PSA:   Prostate Specific Antigen Screen   Date Value Ref Range Status   08/07/2023 5.95 (H) 0.00 - 4.50 ng/mL Final       Reviewed orders with patient. Reviewed health maintenance and updated orders accordingly - Yes  BP Readings from Last 3 Encounters:   08/07/23 138/84   07/31/23 132/72   07/14/23 (!) 145/88    Wt Readings from Last 3 Encounters:   08/07/23 127 kg (280 lb)   07/31/23 126.6 kg (279 lb)   07/14/23 123.4 kg (272 lb)                  Patient Active Problem List   Diagnosis     Morbid obesity (H)     Tobacco abuse disorder     Recurrent major depressive disorder, in partial remission (H)     Generalized anxiety disorder     Lipoma of skin and subcutaneous tissue     History of  prediabetes     Low testosterone in male     Obstructive sleep apnea syndrome     Past Surgical History:   Procedure Laterality Date     right knee replacement       TONSILLECTOMY & ADENOIDECTOMY         Social History     Tobacco Use     Smoking status: Every Day     Packs/day: 1.00     Types: Cigarettes     Smokeless tobacco: Current     Tobacco comments:     Used to smoke up to 2 ppd. Smoking since 18 yo   Substance Use Topics     Alcohol use: Yes     Comment: couple a week - 3-4 beers each time     Family History   Problem Relation Age of Onset     Cancer Mother         lung cancer - smoker     No Known Problems Father      Unknown/Adopted Sister      Unknown/Adopted Sister      Unknown/Adopted Sister          Current Outpatient Medications   Medication Sig Dispense Refill     busPIRone (BUSPAR) 5 MG tablet Take 1 tablet (5 mg) by mouth 2 times daily 60 tablet 0     escitalopram (LEXAPRO) 10 MG tablet Take 1 tablet (10 mg) by mouth daily 30 tablet 0     sildenafil (VIAGRA) 50 MG tablet Take 1 tablet (50 mg) by mouth daily as needed (sexual activities) 10 tablet 1     testosterone (ANDRODERM) 2 MG/24HR 24 hr patch Place 1 patch onto the skin daily 30 patch 1     No Known Allergies  Recent Labs   Lab Test 08/07/23  0825 07/14/23  1240   A1C 5.6  --    *  --    HDL 46  --    TRIG 114  --    ALT 23  --    CR 0.74 0.74   GFRESTIMATED >90 >90   POTASSIUM 4.4 4.4   TSH 2.46  --         Reviewed and updated as needed this visit by clinical staff   Tobacco  Allergies  Meds  Problems  Med Hx  Surg Hx  Fam Hx  Soc   Hx        Reviewed and updated as needed this visit by Provider   Tobacco  Allergies  Meds  Problems  Med Hx  Surg Hx  Fam Hx  Soc   Hx           Review of Systems   Constitutional:  Negative for chills and fever.   HENT:  Negative for congestion, ear pain, hearing loss and sore throat.    Eyes:  Negative for pain and visual disturbance.   Respiratory:  Negative for cough and shortness of  "breath.    Cardiovascular:  Positive for peripheral edema. Negative for chest pain and palpitations.   Gastrointestinal:  Negative for abdominal pain, constipation, diarrhea, heartburn, hematochezia and nausea.   Genitourinary:  Positive for impotence. Negative for dysuria, frequency, genital sores, hematuria, penile discharge and urgency.   Musculoskeletal:  Positive for arthralgias and joint swelling. Negative for myalgias.   Skin:  Negative for rash.   Neurological:  Negative for dizziness, weakness, headaches and paresthesias.   Psychiatric/Behavioral:  Positive for mood changes. The patient is nervous/anxious.      Please see subjective otherwise the complete review of system was negative     OBJECTIVE:   /84   Pulse 76   Temp 97.7  F (36.5  C) (Temporal)   Resp 16   Ht 1.778 m (5' 10\")   Wt 127 kg (280 lb)   SpO2 98%   BMI 40.18 kg/m      Physical Exam  GENERAL: alert and no distress, speaking in full sentences.  He is morbidly obese.  EYES: Eyes grossly normal to inspection, PERRL and conjunctivae and sclerae normal.  No nystagmus.  All 4 visual fields are intact  HENT: Ear canals and TM's normal.  Nares are non-congested. Oropharynx is pink and moist. No tender with palpation to the sinuses.  NECK: no adenopathy, supple, no lymphadenopathy or thyromegaly.  Tender with palpation to the cervical spine or its paraspinous muscle bilaterally.  RESP: lungs clear to auscultation - no rales, rhonchi or wheezes.  Decreased breath sounds throughout  CV: regular rate and rhythm, no murmur.  ABDOMEN: soft, nondistended, nontender, no palpable masses or organomegaly with normal bowel sounds.  MS: no gross musculoskeletal defects noted, no edema.  Walk with no limping, normal gait.  All 4 extremities are equally in strength. Ankle, knees, hips, shoulders, elbows and wrists exams normal.  Normal fine motor skills on fingers.  Back is straight, no lordosis or scoliosis.  No tender with palpation.  SKIN: A soft, " nonmobile subcutaneous nonpigmented nodule on the back of his left head consistent with lipoma appreciated.  It is about 8 x 8 cm.  A 0.75 cm pitted lesion on his right nose with no drainage or irritation.  No tender to palpate.  No crusts.  NEURO: Normal strength and tone, mentation intact and speech normal.  Cranial nerves II through XII intact, DTR +2 throughout, no focal neurological deficit.  PSYCH: mentation appears normal, affect normal.  Thoughts intact, no hallucination.  No suicidal or homicidal ideation.  LYMPH: no cervical, supraclavicular or axillary adenopathy.   (male): Offered and recommended but he declined      Diagnostic Test Results:  Labs reviewed in Epic  Results for orders placed or performed in visit on 08/07/23   HIV Antigen Antibody Combo     Status: Normal   Result Value Ref Range    HIV Antigen Antibody Combo Nonreactive Nonreactive   Hepatitis C Screen Reflex to HCV RNA Quant and Genotype     Status: Normal   Result Value Ref Range    Hepatitis C Antibody Nonreactive Nonreactive    Narrative    Assay performance characteristics have not been established for newborns, infants, and children.   Lipid panel reflex to direct LDL Non-fasting     Status: Abnormal   Result Value Ref Range    Cholesterol 181 <200 mg/dL    Triglycerides 114 <150 mg/dL    Direct Measure HDL 46 >=40 mg/dL    LDL Cholesterol Calculated 112 (H) <=100 mg/dL    Non HDL Cholesterol 135 (H) <130 mg/dL    Narrative    Cholesterol  Desirable:  <200 mg/dL    Triglycerides  Normal:  Less than 150 mg/dL  Borderline High:  150-199 mg/dL  High:  200-499 mg/dL  Very High:  Greater than or equal to 500 mg/dL    Direct Measure HDL  Female:  Greater than or equal to 50 mg/dL   Male:  Greater than or equal to 40 mg/dL    LDL Cholesterol  Desirable:  <100mg/dL  Above Desirable:  100-129 mg/dL   Borderline High:  130-159 mg/dL   High:  160-189 mg/dL   Very High:  >= 190 mg/dL    Non HDL Cholesterol  Desirable:  130 mg/dL  Above  Desirable:  130-159 mg/dL  Borderline High:  160-189 mg/dL  High:  190-219 mg/dL  Very High:  Greater than or equal to 220 mg/dL   Comprehensive metabolic panel (BMP + Alb, Alk Phos, ALT, AST, Total. Bili, TP)     Status: Abnormal   Result Value Ref Range    Sodium 138 136 - 145 mmol/L    Potassium 4.4 3.4 - 5.3 mmol/L    Chloride 104 98 - 107 mmol/L    Carbon Dioxide (CO2) 24 22 - 29 mmol/L    Anion Gap 10 7 - 15 mmol/L    Urea Nitrogen 15.9 8.0 - 23.0 mg/dL    Creatinine 0.74 0.67 - 1.17 mg/dL    Calcium 9.1 8.8 - 10.2 mg/dL    Glucose 119 (H) 70 - 99 mg/dL    Alkaline Phosphatase 97 40 - 129 U/L    AST 16 0 - 45 U/L    ALT 23 0 - 70 U/L    Protein Total 7.1 6.4 - 8.3 g/dL    Albumin 4.3 3.5 - 5.2 g/dL    Bilirubin Total 0.5 <=1.2 mg/dL    GFR Estimate >90 >60 mL/min/1.73m2   Hemoglobin A1c     Status: Normal   Result Value Ref Range    Hemoglobin A1C 5.6 <5.7 %   PSA, screen     Status: Abnormal   Result Value Ref Range    Prostate Specific Antigen Screen 5.95 (H) 0.00 - 4.50 ng/mL    Narrative    This result is obtained using the Roche Elecsys total PSA method on the meme [id number] immunoassay analyzer. Results obtained with different assay methods or kits cannot be used interchangeably.   TSH with free T4 reflex     Status: Normal   Result Value Ref Range    TSH 2.46 0.30 - 4.20 uIU/mL   Sex Hormone Binding Globulin     Status: Normal   Result Value Ref Range    Sex Hormone Binding Globulin 31 11 - 80 nmol/L   Testosterone Free and Total     Status: Abnormal   Result Value Ref Range    Free Testosterone Calculated 4.11 ng/dL    Testosterone Total 208 (L) 240 - 950 ng/dL    Narrative    This test was developed and its performance characteristics determined by the [facility],  Special Chemistry Laboratory. It has not been cleared or approved by the FDA. The laboratory is regulated under CLIA as qualified to perform high-complexity testing. This test is used for clinical purposes. It should  not be regarded as investigational or for research.   Testosterone Free and Total     Status: Abnormal    Narrative    The following orders were created for panel order Testosterone Free and Total.  Procedure                               Abnormality         Status                     ---------                               -----------         ------                     Sex Hormone Binding Glob...[[id number]]  Normal              Final result               Testosterone Free and Total[[id number]]  Abnormal            Final result                 Please view results for these tests on the individual orders.       ASSESSMENT/PLAN:   (Z00.00) Routine general medical examination at a health care facility  (primary encounter diagnosis)  Comment: Overall You is doing well today.  UTD for immunization except of the COVID vaccination which was offered and recommended but he declined.  He received the Tdap, Pneumovax and shingle vaccination today.  Potential side effect discussed and recommended OTC meds as needed for symptomatic treatment.  Discussed about safety issue, healthy diet, exercising, weight management, good sleeping hygiene, advanced directive care, falling prevention, and depression prevention. Exercise for at least 30 minutes daily recommended.  Healthy diet with adequate fluid intake and resting discussed and encouraged. Feels safe.  Follow in 1 year for physical, earlier as needed.  Labs as ordered below - result reviewed.      Colon cancer screening: Emphasized the importance of colon cancer screening.  Discussed with him about options for colon cancer screening which include colonoscopy, Cologuard or FIT test.  Pros and cons of each option discussed.  He refused to colon cancer screening altogether.  He is willing to take the risk.  He was informed and understood that delayed diagnosis of colon cancer can be terminal and detected it early can cure of early colon cancer    Prostate cancer screening:   Discussed with him about the pros and cons of prostate screening cancer with PSA.  Also educated about the potential false positive which may require unnecessary work-up.  He was informed of negative/normal PSA leve does not rule out prostate cancer completely although it is very unlikely.  He understands and is willing to take the risk.     He denied of STD risk.  All of his questions were answered.     Plan: HIV Antigen Antibody Combo, Hepatitis C Screen         Reflex to HCV RNA Quant and Genotype, Lipid         panel reflex to direct LDL Non-fasting,         Comprehensive metabolic panel (BMP + Alb, Alk         Phos, ALT, AST, Total. Bili, TP), Hemoglobin         A1c, PSA, screen, TSH with free T4 reflex,         Testosterone Free and Total            (D17.30) Lipoma of skin and subcutaneous tissue  Comment: He was informed of its benign nature.  It is symptomatic and therefore I agreed that it should be removed.  Encouraged to work with the surgeon closely.  He is clear for the procedure as scheduled.  No further work up is needed.        (F41.1) Generalized anxiety disorder  (F33.41) Recurrent major depressive disorder, in partial remission (H)  Comment: Known to have depression and anxiety for years which tolerable for years without medication or counseling.  He learned to live with them.  His depression and anxiety have gotten significantly worse in the last several months since moving to [place] and his [facility].  Feels more anxious than depression.  No suicidal or homicidal ideation.  No hallucination.  Did not tolerate Prozac and Wellbutrin in the past.  No drugs or alcohol.    Discussed with him about their treatment option.  Declined counseling.  Will have tried him try the BuSpar and Lexapro.  Potential side effect discussed.  Symptoms that need to be seen or discussed.  Follow-up in a month, earlier if has any concerns or questions.  He was instructed to go to the ER or call 911 if develops  suicidal/homicidal ideation, thought or intention.    Plan: escitalopram (LEXAPRO) 10 MG tablet, busPIRone         (BUSPAR) 5 MG tablet, TN BEHAV ASSMT W/SCORE &         DOCD/STAND INSTRUMENT              (Z87.898) History of prediabetes  Comment: Discussed with him about the importance of healthy lifestyle modification as discussed above, specifically with weight loss, exercising and healthy diet.  Recheck hemoglobin A1c today and it was normal.  Will continue to screen for diabetes at least annually.    Plan: Hemoglobin A1c            (E66.01) Morbid obesity (H)  (R63.5) Weight gain  Comment: He is morbidly obese with a BMI of 40 today.  He also has sleep apnea, anxiety/depression and history of prediabetes.  Discussed about its potential long and short-term complications.  Also educated himabout the goal for his weight and BMI.  Exercising and healthy/portioned diet discussed and encouraged.  Offered dietitian/or weight management consultation but he declined.  Will consider pharmacological treatment once having a good healthy lifestyle routine.  No history of thyroid problem and TSH level was was normal.     Plan: Hemoglobin A1c, TSH with free T4 reflex            (N52.9) Erectile dysfunction, unspecified erectile dysfunction type  Comment: Has not been sexually active or having an erection for over couple years.  He smokes 1 ppd  for over 50 years and is not ready to quit.  Denied of excessive alcohol intake.  He is also morbidly obese.  No history of CAD and is not taking nitrates.  Not on medications for prostate problem.  Will have him try the Viagra.  Potential side effect discussed.  He was instructed to go to the emergency room if develops painful erection or erection the last more than 3 hours.    Plan: Testosterone Free and Total, sildenafil         (VIAGRA) 50 MG tablet            (R68.82) Low libido  (R79.89) Low testosterone in male  Comment: His testosterone level was quite low today.  Which is  likely the cause of his low libido.  Discussed with him about the nature condition.  Recommended thousand supplement and he agreed.  Potential complications associated with the testosterone supplements discussed which include but not limited to BPH, polycythemia and increased CAD risk.  He is willing to take the risk.  Recheck the PSA., CBC, and testosterone level in 3 months.  Also strongly encouraged him to work on weight loss.    Plan: Testosterone Free and Total, testosterone         (ANDRODERM) 2 MG/24HR 24 hr patch              (Z72.0) Tobacco abuse disorder  Comment: You has been smoking a ppd for more than 45 years.  Currently smokes about 1 ppd and is not ready to quit.  Discussed with him about the long and short term consequences of tobacco smoking.  Also discussed about options for smoking cessation aids.  He is not ready to quit smoking at this time.  He was encouraged to let me know when he is ready to quit.  In a mean time, I encourage him to reduce to amount of cigarrets smoke as much as possible.  Recent chest CT scan showed no concern finding.  All of his questions were answered.       (L98.9) Skin lesion  Comment: Benign looking but skin cancer has not rule out due to its poor healing for over a year, I recommend biopsy or removal and due to its location, will need to refer to general surgery or dermatology for it.  He want to hold off for now,  will let me know when he is ready the referral.  He was informed and understood that delayed skin cancer treatment can cause spread which is terminal.  He is willing to take the risk.      (G47.33) Obstructive sleep apnea syndrome  Comment: He is morbidly obese.  He was diagnosed with sleep apnea about 15 years ago.  Did not tolerate the CPAP.  I had a long conversation with him about the potential complications associated with uncontrolled sleep apnea.  Offered referral to sleep medicine for reevaluation but he declined.  He is willing to take the  "risk.  Emphasized on weight loss as well.  Instructed not to drive if he feels overly tired.      Patient has been advised of split billing requirements and indicates understanding: Yes      COUNSELING:   Reviewed preventive health counseling, as reflected in patient instructions       Regular exercise       Healthy diet/nutrition       Vision screening       Hearing screening       Pneumococcal Vaccine          Immunizations  Vaccinated for: Pneumococcal, TDAP, and Zoster and Declined: Covid-19 due to Conscientious objector             Aspirin prophylaxis        Alcohol Use        Consider Hep C screening for all patients one time for ages 18-79 years       Colorectal cancer screening       Prostate cancer screening       Osteoporosis prevention/bone health       Advance Care Planning      BMI:   Estimated body mass index is 40.18 kg/m  as calculated from the following:    Height as of this encounter: 1.778 m (5' 10\").    Weight as of this encounter: 127 kg (280 lb).   Weight management plan: Discussed healthy diet and exercise guidelines      He reports that he has been smoking cigarettes. He has been smoking an average of 1 pack per day. He uses smokeless tobacco.  Nicotine/Tobacco Cessation Plan:   Information offered: Patient not interested at this time            Neel Schmitz Mai, MD  [facility]  "

## 2023-08-07 NOTE — LETTER
My Depression Action Plan  Name: Yuo Abbott   Date of Birth 1960  Date: 8/12/2023    My doctor: Neel Bradley   My clinic: [address] [address] [phone]            GREEN    ZONE   Good Control    What it looks like:   Things are going generally well. You have normal ups and downs. You may even feel depressed from time to time, but bad moods usually last less than a day.   What you need to do:  Continue to care for yourself (see self care plan)  Check your depression survival kit and update it as needed  Follow your physician s recommendations including any medication.  Do not stop taking medication unless you consult with your physician first.             YELLOW         ZONE Getting Worse    What it looks like:   Depression is starting to interfere with your life.   It may be hard to get out of bed; you may be starting to isolate yourself from others.  Symptoms of depression are starting to last most all day and this has happened for several days.   You may have suicidal thoughts but they are not constant.   What you need to do:     Call your care team. Your response to treatment will improve if you keep your care team informed of your progress. Yellow periods are signs an adjustment may need to be made.     Continue your self-care.  Just get dressed and ready for the day.  Don't give yourself time to talk yourself out of it.    Talk to someone in your support network.    Open up your Depression Self-Care Plan/Wellness Kit.             RED    ZONE Medical Alert - Get Help    What it looks like:   Depression is seriously interfering with your life.   You may experience these or other symptoms: You can t get out of bed most days, can t work or engage in other necessary activities, you have trouble taking care of basic hygiene, or basic responsibilities, thoughts of suicide or death that will not go away, self-injurious behavior.      What you need to do:  Call your care team and request a same-day appointment. If they are not available (weekends or after hours) call your local crisis line, emergency room or 911.          Depression Self-Care Plan / Wellness Kit    Many people find that medication and therapy are helpful treatments for managing depression. In addition, making small changes to your everyday life can help to boost your mood and improve your wellbeing. Below are some tips for you to consider. Be sure to talk with your medical provider and/or behavioral health consultant if your symptoms are worsening or not improving.     Sleep   Sleep hygiene  means all of the habits that support good, restful sleep. It includes maintaining a consistent bedtime and wake time, using your bedroom only for sleeping or sex, and keeping the bedroom dark and free of distractions like a computer, smartphone, or television.     Develop a Healthy Routine  Maintain good hygiene. Get out of bed in the morning, make your bed, brush your teeth, take a shower, and get dressed. Don t spend too much time viewing media that makes you feel stressed. Find time to relax each day.    Exercise  Get some form of exercise every day. This will help reduce pain and release endorphins, the  feel good  chemicals in your brain. It can be as simple as just going for a walk or doing some gardening, anything that will get you moving.      Diet  Strive to eat healthy foods, including fruits and vegetables. Drink plenty of water. Avoid excessive sugar, caffeine, alcohol, and other mood-altering substances.     Stay Connected with Others  Stay in touch with friends and family members.    Manage Your Mood  Try deep breathing, massage therapy, biofeedback, or meditation. Take part in fun activities when you can. Try to find something to smile about each day.     Psychotherapy  Be open to working with a therapist if your provider recommends it.     Medication  Be sure to take your  medication as prescribed. Most anti-depressants need to be taken every day. It usually takes several weeks for medications to work. Not all medicines work for all people. It is important to follow-up with your provider to make sure you have a treatment plan that is working for you. Do not stop your medication abruptly without first discussing it with your provider.    Crisis Resources   These hotlines are for both adults and children. They and are open 24 hours a day, 7 days a week unless noted otherwise.    National Suicide Prevention Lifeline   988 or 5-869-523-IINZ (7052)    Crisis Text Line    www.crisistextline.org  Text HOME to 528009 from anywhere in the [place], anytime, about any type of crisis. A live, trained crisis counselor will receive the text and respond quickly.    [facility] Lifeline for LGBTQ Youth  A national crisis intervention and suicide lifeline for LGBTQ youth under 25. Provides a safe place to talk without judgement. Call 1-863.186.4071; text START to 884104 or visit www.[facility].org to talk to a trained counselor.    For [facility] crisis numbers, visit the [facility] website at:  https://[place].gov/dhs/people-we-serve/adults/health-care/mental-health/resources/crisis-contacts.jsp

## 2023-08-07 NOTE — TELEPHONE ENCOUNTER
Type of surgery: Excision, mass back   Location of surgery: [facility]  Date and time of surgery: 09/01/2023  Surgeon: Sanjuanita  Pre-Op Appt Date: 08/07/2023  Post-Op Appt Date: 09/14/2023   Packet sent out: Yes  Pre-cert/Authorization completed:  No  Date:

## 2023-08-07 NOTE — H&P (VIEW-ONLY)
SUBJECTIVE:   CC: Liang is an 63 year old who presents for preventative health visit.       8/7/2023     7:07 AM   Additional Questions   Roomed by Dawn Stacy       Healthy Habits:     Getting at least 3 servings of Calcium per day:  Yes    Bi-annual eye exam:  Yes    Dental care twice a year:  NO    Sleep apnea or symptoms of sleep apnea:  None    Diet:  Regular (no restrictions)    Frequency of exercise:  None    Taking medications regularly:  Not Applicable    Medication side effects:  Not applicable    Additional concerns today:  No      Today's PHQ-9 Score:       8/7/2023     7:00 AM   PHQ-9 SCORE   PHQ-9 Total Score [place] 8 (Mild depression)   PHQ-9 Total Score 8     You is here today for physical, establish care and general follow-up.  He is new to the clinic, recently moved to from [place].    1.  He has a mass on the back of his neck that been there for years.  It is getting bigger and causing neck stiffness/pain and headache.  Saw surgery recently and diagnosed with lipoma.  He is scheduled to have its removed in the near future.  Not sure if he will need a preop physical.  No personal or family history of anesthesia complication.  No history of blood transfusion and is okay to be transfused if necessary.  No personal or family history of premature CAD, diabetes, hypertension, CVA or PAD.  No asthma or COPD.  Been a smoker for more than 50 years, average about a pack a day.  No wheezing or chest tightness sensation.  No breathing concerns    2.  Also has a lesion on the right nose been there for about a year.  It is getting bigger and has not healed completely for over a year.  Not crusted.  No pain or change its color  - has been picking on it.  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    3.  Stated that he has problem with his weight for most of his life.  Was treated with Topamax and phentermine for 3 months when he was  in [place] and he lost significant amount of weight from it.  No side effect.  Been gain most of the weight since stopped taking it.  In fact, he has gained about 30 in th e last in the last 6 months since moving to [place] and senior care.  Has not been doing much - not exercising.  His work was very labor extensive - miss it a lot.  No history of thyroid problem.  He is interest to be back on the medication.    4.  Next concern is about ED and low libido.  His libido has been low for years has it has gotten worse.  He is careless about intimacy and in fact he has not been sexually active for at least couple years.  Not sure if he is able to get erection anymore.  It is affecting his relationship.  Would like to try Viagra and to be further evaluated for testosterone.  Was told that his testosterone was low several years ago but never been treated for it.  No history of thyroid problem.  Never tried Viagra before.     5.  Also known to have depression and anxiety for years which attributed to the stress.  Was on Prozac and Wellbutrin but did not like the side effects, especially the Wellbutrin.  Been off the medications for years - learned to live with them.  Been having more stress since moving to [place] and his senior care.  His depression and anxiety have gotten worse and they are effecting his life.  No mood swing and never been diagnosed or treated for mood disorder.  Been having a lot of anxiety with anxiety attacks.  No suicidal or homicidal ideation.  No hallucination.  No drugs or excessive alcohol intake.  No problem with sleeping.  Would like to try something for them.  He feels he is mainly affected by the anxiety, not much of the depression.    6.  Continues to smoke about 1 ppd, not ready to quit.  Been a smoker for about 50 years, average about a pack per day.  Not ready to quit.  Stated that he was diagnosed with sleep apnea about 15 years ago and was on the CPAP.  Did not tolerate the CPAP and  has not used it since then.  Continue to have problems with excessive daytime fatigue and sleepiness.    7.  No major medical care or procedure done since the last physical several years ago.  No headache, dizziness or acute visual change. No runny nose, nasal congestion, coughing, fever or chill. No CP/SOB. No N/V/D/C or problem with urination - no nocturia.  No abdominal pain.  No leg swelling or pain.  Not exercising. Social drinker but no drug use.  Feels safe, lives with his significant other.  No other concern today       Have you ever done Advance Care Planning? (For example, a Health Directive, POLST, or a discussion with a medical provider or your loved ones about your wishes): No, advance care planning information given to patient to review.  Advanced care planning was discussed at today's visit.    Social History     Tobacco Use    Smoking status: Every Day     Packs/day: 1.00     Types: Cigarettes    Smokeless tobacco: Current    Tobacco comments:     Used to smoke up to 2 ppd. Smoking since 18 yo   Substance Use Topics    Alcohol use: Yes     Comment: couple a week - 3-4 beers each time             8/7/2023     6:58 AM   Alcohol Use   Prescreen: >3 drinks/day or >7 drinks/week? Yes   AUDIT SCORE  9         Last PSA:   Prostate Specific Antigen Screen   Date Value Ref Range Status   08/07/2023 5.95 (H) 0.00 - 4.50 ng/mL Final       Reviewed orders with patient. Reviewed health maintenance and updated orders accordingly - Yes  BP Readings from Last 3 Encounters:   08/07/23 138/84   07/31/23 132/72   07/14/23 (!) 145/88    Wt Readings from Last 3 Encounters:   08/07/23 127 kg (280 lb)   07/31/23 126.6 kg (279 lb)   07/14/23 123.4 kg (272 lb)                  Patient Active Problem List   Diagnosis    Morbid obesity (H)    Tobacco abuse disorder    Recurrent major depressive disorder, in partial remission (H)    Generalized anxiety disorder    Lipoma of skin and subcutaneous tissue    History of prediabetes     Low testosterone in male    Obstructive sleep apnea syndrome     Past Surgical History:   Procedure Laterality Date    right knee replacement      TONSILLECTOMY & ADENOIDECTOMY         Social History     Tobacco Use    Smoking status: Every Day     Packs/day: 1.00     Types: Cigarettes    Smokeless tobacco: Current    Tobacco comments:     Used to smoke up to 2 ppd. Smoking since 20 yo   Substance Use Topics    Alcohol use: Yes     Comment: couple a week - 3-4 beers each time     Family History   Problem Relation Age of Onset    Cancer Mother         lung cancer - smoker    No Known Problems Father     Unknown/Adopted Sister     Unknown/Adopted Sister     Unknown/Adopted Sister          Current Outpatient Medications   Medication Sig Dispense Refill    busPIRone (BUSPAR) 5 MG tablet Take 1 tablet (5 mg) by mouth 2 times daily 60 tablet 0    escitalopram (LEXAPRO) 10 MG tablet Take 1 tablet (10 mg) by mouth daily 30 tablet 0    sildenafil (VIAGRA) 50 MG tablet Take 1 tablet (50 mg) by mouth daily as needed (sexual activities) 10 tablet 1    testosterone (ANDRODERM) 2 MG/24HR 24 hr patch Place 1 patch onto the skin daily 30 patch 1     No Known Allergies  Recent Labs   Lab Test 08/07/23  0825 07/14/23  1240   A1C 5.6  --    *  --    HDL 46  --    TRIG 114  --    ALT 23  --    CR 0.74 0.74   GFRESTIMATED >90 >90   POTASSIUM 4.4 4.4   TSH 2.46  --         Reviewed and updated as needed this visit by clinical staff   Tobacco  Allergies  Meds  Problems  Med Hx  Surg Hx  Fam Hx  Soc   Hx        Reviewed and updated as needed this visit by Provider   Tobacco  Allergies  Meds  Problems  Med Hx  Surg Hx  Fam Hx  Soc   Hx           Review of Systems   Constitutional:  Negative for chills and fever.   HENT:  Negative for congestion, ear pain, hearing loss and sore throat.    Eyes:  Negative for pain and visual disturbance.   Respiratory:  Negative for cough and shortness of breath.    Cardiovascular:   "Positive for peripheral edema. Negative for chest pain and palpitations.   Gastrointestinal:  Negative for abdominal pain, constipation, diarrhea, heartburn, hematochezia and nausea.   Genitourinary:  Positive for impotence. Negative for dysuria, frequency, genital sores, hematuria, penile discharge and urgency.   Musculoskeletal:  Positive for arthralgias and joint swelling. Negative for myalgias.   Skin:  Negative for rash.   Neurological:  Negative for dizziness, weakness, headaches and paresthesias.   Psychiatric/Behavioral:  Positive for mood changes. The patient is nervous/anxious.      Please see subjective otherwise the complete review of system was negative     OBJECTIVE:   /84   Pulse 76   Temp 97.7  F (36.5  C) (Temporal)   Resp 16   Ht 1.778 m (5' 10\")   Wt 127 kg (280 lb)   SpO2 98%   BMI 40.18 kg/m      Physical Exam  GENERAL: alert and no distress, speaking in full sentences.  He is morbidly obese.  EYES: Eyes grossly normal to inspection, PERRL and conjunctivae and sclerae normal.  No nystagmus.  All 4 visual fields are intact  HENT: Ear canals and TM's normal.  Nares are non-congested. Oropharynx is pink and moist. No tender with palpation to the sinuses.  NECK: no adenopathy, supple, no lymphadenopathy or thyromegaly.  Tender with palpation to the cervical spine or its paraspinous muscle bilaterally.  RESP: lungs clear to auscultation - no rales, rhonchi or wheezes.  Decreased breath sounds throughout  CV: regular rate and rhythm, no murmur.  ABDOMEN: soft, nondistended, nontender, no palpable masses or organomegaly with normal bowel sounds.  MS: no gross musculoskeletal defects noted, no edema.  Walk with no limping, normal gait.  All 4 extremities are equally in strength. Ankle, knees, hips, shoulders, elbows and wrists exams normal.  Normal fine motor skills on fingers.  Back is straight, no lordosis or scoliosis.  No tender with palpation.  SKIN: A soft, nonmobile subcutaneous " nonpigmented nodule on the back of his left head consistent with lipoma appreciated.  It is about 8 x 8 cm.  A 0.75 cm pitted lesion on his right nose with no drainage or irritation.  No tender to palpate.  No crusts.  NEURO: Normal strength and tone, mentation intact and speech normal.  Cranial nerves II through XII intact, DTR +2 throughout, no focal neurological deficit.  PSYCH: mentation appears normal, affect normal.  Thoughts intact, no hallucination.  No suicidal or homicidal ideation.  LYMPH: no cervical, supraclavicular or axillary adenopathy.   (male): Offered and recommended but he declined      Diagnostic Test Results:  Labs reviewed in Epic  Results for orders placed or performed in visit on 08/07/23   HIV Antigen Antibody Combo     Status: Normal   Result Value Ref Range    HIV Antigen Antibody Combo Nonreactive Nonreactive   Hepatitis C Screen Reflex to HCV RNA Quant and Genotype     Status: Normal   Result Value Ref Range    Hepatitis C Antibody Nonreactive Nonreactive    Narrative    Assay performance characteristics have not been established for newborns, infants, and children.   Lipid panel reflex to direct LDL Non-fasting     Status: Abnormal   Result Value Ref Range    Cholesterol 181 <200 mg/dL    Triglycerides 114 <150 mg/dL    Direct Measure HDL 46 >=40 mg/dL    LDL Cholesterol Calculated 112 (H) <=100 mg/dL    Non HDL Cholesterol 135 (H) <130 mg/dL    Narrative    Cholesterol  Desirable:  <200 mg/dL    Triglycerides  Normal:  Less than 150 mg/dL  Borderline High:  150-199 mg/dL  High:  200-499 mg/dL  Very High:  Greater than or equal to 500 mg/dL    Direct Measure HDL  Female:  Greater than or equal to 50 mg/dL   Male:  Greater than or equal to 40 mg/dL    LDL Cholesterol  Desirable:  <100mg/dL  Above Desirable:  100-129 mg/dL   Borderline High:  130-159 mg/dL   High:  160-189 mg/dL   Very High:  >= 190 mg/dL    Non HDL Cholesterol  Desirable:  130 mg/dL  Above Desirable:  130-159  mg/dL  Borderline High:  160-189 mg/dL  High:  190-219 mg/dL  Very High:  Greater than or equal to 220 mg/dL   Comprehensive metabolic panel (BMP + Alb, Alk Phos, ALT, AST, Total. Bili, TP)     Status: Abnormal   Result Value Ref Range    Sodium 138 136 - 145 mmol/L    Potassium 4.4 3.4 - 5.3 mmol/L    Chloride 104 98 - 107 mmol/L    Carbon Dioxide (CO2) 24 22 - 29 mmol/L    Anion Gap 10 7 - 15 mmol/L    Urea Nitrogen 15.9 8.0 - 23.0 mg/dL    Creatinine 0.74 0.67 - 1.17 mg/dL    Calcium 9.1 8.8 - 10.2 mg/dL    Glucose 119 (H) 70 - 99 mg/dL    Alkaline Phosphatase 97 40 - 129 U/L    AST 16 0 - 45 U/L    ALT 23 0 - 70 U/L    Protein Total 7.1 6.4 - 8.3 g/dL    Albumin 4.3 3.5 - 5.2 g/dL    Bilirubin Total 0.5 <=1.2 mg/dL    GFR Estimate >90 >60 mL/min/1.73m2   Hemoglobin A1c     Status: Normal   Result Value Ref Range    Hemoglobin A1C 5.6 <5.7 %   PSA, screen     Status: Abnormal   Result Value Ref Range    Prostate Specific Antigen Screen 5.95 (H) 0.00 - 4.50 ng/mL    Narrative    This result is obtained using the Roche Elecsys total PSA method on the meme [id number] immunoassay analyzer. Results obtained with different assay methods or kits cannot be used interchangeably.   TSH with free T4 reflex     Status: Normal   Result Value Ref Range    TSH 2.46 0.30 - 4.20 uIU/mL   Sex Hormone Binding Globulin     Status: Normal   Result Value Ref Range    Sex Hormone Binding Globulin 31 11 - 80 nmol/L   Testosterone Free and Total     Status: Abnormal   Result Value Ref Range    Free Testosterone Calculated 4.11 ng/dL    Testosterone Total 208 (L) 240 - 950 ng/dL    Narrative    This test was developed and its performance characteristics determined by the [facility],  Special [facility] Laboratory. It has not been cleared or approved by the FDA. The laboratory is regulated under CLIA as qualified to perform high-complexity testing. This test is used for clinical purposes. It should not be regarded as  investigational or for research.   Testosterone Free and Total     Status: Abnormal    Narrative    The following orders were created for panel order Testosterone Free and Total.  Procedure                               Abnormality         Status                     ---------                               -----------         ------                     Sex Hormone Binding Glob...[[id number]]  Normal              Final result               Testosterone Free and Total[[id number]]  Abnormal            Final result                 Please view results for these tests on the individual orders.       ASSESSMENT/PLAN:   (Z00.00) Routine general medical examination at a health care facility  (primary encounter diagnosis)  Comment: Overall You is doing well today.  UTD for immunization except of the COVID vaccination which was offered and recommended but he declined.  He received the Tdap, Pneumovax and shingle vaccination today.  Potential side effect discussed and recommended OTC meds as needed for symptomatic treatment.  Discussed about safety issue, healthy diet, exercising, weight management, good sleeping hygiene, advanced directive care, falling prevention, and depression prevention. Exercise for at least 30 minutes daily recommended.  Healthy diet with adequate fluid intake and resting discussed and encouraged. Feels safe.  Follow in 1 year for physical, earlier as needed.  Labs as ordered below - result reviewed.      Colon cancer screening: Emphasized the importance of colon cancer screening.  Discussed with him about options for colon cancer screening which include colonoscopy, Cologuard or FIT test.  Pros and cons of each option discussed.  He refused to colon cancer screening altogether.  He is willing to take the risk.  He was informed and understood that delayed diagnosis of colon cancer can be terminal and detected it early can cure of early colon cancer    Prostate cancer screening:  Discussed with him about  the pros and cons of prostate screening cancer with PSA.  Also educated about the potential false positive which may require unnecessary work-up.  He was informed of negative/normal PSA leve does not rule out prostate cancer completely although it is very unlikely.  He understands and is willing to take the risk.     He denied of STD risk.  All of his questions were answered.     Plan: HIV Antigen Antibody Combo, Hepatitis C Screen         Reflex to HCV RNA Quant and Genotype, Lipid         panel reflex to direct LDL Non-fasting,         Comprehensive metabolic panel (BMP + Alb, Alk         Phos, ALT, AST, Total. Bili, TP), Hemoglobin         A1c, PSA, screen, TSH with free T4 reflex,         Testosterone Free and Total            (D17.30) Lipoma of skin and subcutaneous tissue  Comment: He was informed of its benign nature.  It is symptomatic and therefore I agreed that it should be removed.  Encouraged to work with the surgeon closely.  He is clear for the procedure as scheduled.  No further work up is needed.        (F41.1) Generalized anxiety disorder  (F33.41) Recurrent major depressive disorder, in partial remission (H)  Comment: Known to have depression and anxiety for years which tolerable for years without medication or counseling.  He learned to live with them.  His depression and anxiety have gotten significantly worse in the last several months since moving to [place] and his care home.  Feels more anxious than depression.  No suicidal or homicidal ideation.  No hallucination.  Did not tolerate Prozac and Wellbutrin in the past.  No drugs or alcohol.    Discussed with him about their treatment option.  Declined counseling.  Will have tried him try the BuSpar and Lexapro.  Potential side effect discussed.  Symptoms that need to be seen or discussed.  Follow-up in a month, earlier if has any concerns or questions.  He was instructed to go to the ER or call 911 if develops suicidal/homicidal ideation,  thought or intention.    Plan: escitalopram (LEXAPRO) 10 MG tablet, busPIRone         (BUSPAR) 5 MG tablet, MO BEHAV ASSMT W/SCORE &         DOCD/STAND INSTRUMENT              (Z87.868) History of prediabetes  Comment: Discussed with him about the importance of healthy lifestyle modification as discussed above, specifically with weight loss, exercising and healthy diet.  Recheck hemoglobin A1c today and it was normal.  Will continue to screen for diabetes at least annually.    Plan: Hemoglobin A1c            (E66.01) Morbid obesity (H)  (R63.5) Weight gain  Comment: He is morbidly obese with a BMI of 40 today.  He also has sleep apnea, anxiety/depression and history of prediabetes.  Discussed about its potential long and short-term complications.  Also educated himabout the goal for his weight and BMI.  Exercising and healthy/portioned diet discussed and encouraged.  Offered dietitian/or weight management consultation but he declined.  Will consider pharmacological treatment once having a good healthy lifestyle routine.  No history of thyroid problem and TSH level was was normal.     Plan: Hemoglobin A1c, TSH with free T4 reflex            (N52.9) Erectile dysfunction, unspecified erectile dysfunction type  Comment: Has not been sexually active or having an erection for over couple years.  He smokes 1 ppd  for over 50 years and is not ready to quit.  Denied of excessive alcohol intake.  He is also morbidly obese.  No history of CAD and is not taking nitrates.  Not on medications for prostate problem.  Will have him try the Viagra.  Potential side effect discussed.  He was instructed to go to the emergency room if develops painful erection or erection the last more than 3 hours.    Plan: Testosterone Free and Total, sildenafil         (VIAGRA) 50 MG tablet            (R68.82) Low libido  (R79.89) Low testosterone in male  Comment: His testosterone level was quite low today.  Which is likely the cause of his low  libido.  Discussed with him about the nature condition.  Recommended thousand supplement and he agreed.  Potential complications associated with the testosterone supplements discussed which include but not limited to BPH, polycythemia and increased CAD risk.  He is willing to take the risk.  Recheck the PSA., CBC, and testosterone level in 3 months.  Also strongly encouraged him to work on weight loss.    Plan: Testosterone Free and Total, testosterone         (ANDRODERM) 2 MG/24HR 24 hr patch              (Z72.0) Tobacco abuse disorder  Comment: You has been smoking a ppd for more than 45 years.  Currently smokes about 1 ppd and is not ready to quit.  Discussed with him about the long and short term consequences of tobacco smoking.  Also discussed about options for smoking cessation aids.  He is not ready to quit smoking at this time.  He was encouraged to let me know when he is ready to quit.  In a mean time, I encourage him to reduce to amount of cigarrets smoke as much as possible.  Recent chest CT scan showed no concern finding.  All of his questions were answered.       (L98.9) Skin lesion  Comment: Benign looking but skin cancer has not rule out due to its poor healing for over a year, I recommend biopsy or removal and due to its location, will need to refer to general surgery or dermatology for it.  He want to hold off for now,  will let me know when he is ready the referral.  He was informed and understood that delayed skin cancer treatment can cause spread which is terminal.  He is willing to take the risk.      (G47.33) Obstructive sleep apnea syndrome  Comment: He is morbidly obese.  He was diagnosed with sleep apnea about 15 years ago.  Did not tolerate the CPAP.  I had a long conversation with him about the potential complications associated with uncontrolled sleep apnea.  Offered referral to sleep medicine for reevaluation but he declined.  He is willing to take the risk.  Emphasized on weight  "loss as well.  Instructed not to drive if he feels overly tired.      Patient has been advised of split billing requirements and indicates understanding: Yes      COUNSELING:   Reviewed preventive health counseling, as reflected in patient instructions       Regular exercise       Healthy diet/nutrition       Vision screening       Hearing screening       Pneumococcal Vaccine          Immunizations  Vaccinated for: Pneumococcal, TDAP, and Zoster and Declined: Covid-19 due to Conscientious objector             Aspirin prophylaxis        Alcohol Use        Consider Hep C screening for all patients one time for ages 18-79 years       Colorectal cancer screening       Prostate cancer screening       Osteoporosis prevention/bone health       Advance Care Planning      BMI:   Estimated body mass index is 40.18 kg/m  as calculated from the following:    Height as of this encounter: 1.778 m (5' 10\").    Weight as of this encounter: 127 kg (280 lb).   Weight management plan: Discussed healthy diet and exercise guidelines      He reports that he has been smoking cigarettes. He has been smoking an average of 1 pack per day. He uses smokeless tobacco.  Nicotine/Tobacco Cessation Plan:   Information offered: Patient not interested at this time            Neel Schmitz Mai, MD  [facility]  "

## 2023-08-08 ENCOUNTER — TELEPHONE (OUTPATIENT)
Dept: FAMILY MEDICINE | Facility: CLINIC | Age: 63
End: 2023-08-08
Payer: COMMERCIAL

## 2023-08-08 LAB
HCV AB SERPL QL IA: NONREACTIVE
HIV 1+2 AB+HIV1 P24 AG SERPL QL IA: NONREACTIVE
SHBG SERPL-SCNC: 31 NMOL/L (ref 11–80)

## 2023-08-08 NOTE — TELEPHONE ENCOUNTER
----- Message from Nele Schmitz Mai, MD sent at 8/7/2023 12:33 PM CDT -----  Please let patient know that his labs showed his cholesterol looks pretty good, minimally elevated.  No medication for now.  Recommend to continue working on exercising, healthy diet and weight loss as discussed.  Recheck in a year.  His prostate enzyme level was slightly elevated.  Not necessary that he has prostate cancer but further evaluation is recommended.  Will refer him to new urologist for further evaluation.  His kidney function, liver enzymes and electrolytes were normal with a hemoglobin A1c of 5.6 which was normal.  No diabetes.  His thyroid level was also normal.  Will let him know once other lab results available.

## 2023-08-08 NOTE — TELEPHONE ENCOUNTER
Spoke with patient and informed of results below. Patient understood.     Closing encounter.   Frances Arnold MA

## 2023-08-09 ENCOUNTER — TELEPHONE (OUTPATIENT)
Dept: FAMILY MEDICINE | Facility: CLINIC | Age: 63
End: 2023-08-09
Payer: COMMERCIAL

## 2023-08-09 NOTE — TELEPHONE ENCOUNTER
General Call      Reason for Call:  Patient stopped in and stated his surgery has been moved to September 6th. He had an appointment on August 7th with Dr. Bradley.    What are your questions or concerns:  Patient stated his August 7th appointment needs to be Denote per surgery Doctor.    Date of last appointment with provider: August 7th.    Okay to leave a detailed message?: Yes at Home number on file [phone] (home)

## 2023-08-09 NOTE — LETTER
September 7, 2023      Liang Abbott  [address]  [place] [address]        Dear ,    We are writing to inform you of your test results.    Testosterone level remains to be low, recommend to be treated.     Resulted Orders   Sex Hormone Binding Globulin   Result Value Ref Range    Sex Hormone Binding Globulin 29 11 - 80 nmol/L   Testosterone Free and Total   Result Value Ref Range    Free Testosterone Calculated 2.66 ng/dL      Comment:      Male Donte Ranges:  Donte Stage I: Less than or equal to 0.37 ng/dL  Donte Stage II: 0.03-2.1 ng/dL  Donte Stage III: 0.10-9.8 ng/dL  Donte Stage IV: 3.5-16.9 ng/dL  Donte Stage V: 4.1-23.9 ng/dL    Testosterone Total 133 (L) 240 - 950 ng/dL    Narrative    This test was developed and its performance characteristics determined by the [facility],  Special Chemistry Laboratory. It has not been cleared or approved by the FDA. The laboratory is regulated under CLIA as qualified to perform high-complexity testing. This test is used for clinical purposes. It should not be regarded as investigational or for research.       If you have any questions or concerns, please call the clinic at the number listed above.       Sincerely,

## 2023-08-09 NOTE — TELEPHONE ENCOUNTER
Patient has been canceled for 09/01/2023 there was not enough time for surgery before 's colonoscopy schedule. Patient has been rescheduled to 09/06/2023  Advised patient that he needed to schedule a preop, patient noted that he would speak with his physician.   Postop rescheduled to 09/21/2023

## 2023-08-10 LAB
TESTOST FREE SERPL-MCNC: 4.11 NG/DL
TESTOST SERPL-MCNC: 208 NG/DL (ref 240–950)

## 2023-08-12 PROBLEM — R79.89 LOW TESTOSTERONE IN MALE: Status: ACTIVE | Noted: 2023-08-12

## 2023-08-12 PROBLEM — G47.33 OBSTRUCTIVE SLEEP APNEA SYNDROME: Status: ACTIVE | Noted: 2023-08-12

## 2023-08-13 ENCOUNTER — TELEPHONE (OUTPATIENT)
Dept: FAMILY MEDICINE | Facility: CLINIC | Age: 63
End: 2023-08-13
Payer: COMMERCIAL

## 2023-08-13 DIAGNOSIS — R79.89 LOW TESTOSTERONE IN MALE: Primary | ICD-10-CM

## 2023-08-13 NOTE — TELEPHONE ENCOUNTER
Script was sent over for Androdern patch, medication not available (discontinued). Please send over new RX for something else or talk to patient for options. Thank you.    Daniela Hagan, Pharmacy Technician  [facility]  [phone]

## 2023-08-14 ENCOUNTER — TELEPHONE (OUTPATIENT)
Dept: FAMILY MEDICINE | Facility: CLINIC | Age: 63
End: 2023-08-14
Payer: COMMERCIAL

## 2023-08-14 RX ORDER — TESTOSTERONE CYPIONATE 200 MG/ML
50 INJECTION, SOLUTION INTRAMUSCULAR
Qty: 10 ML | Refills: 0 | Status: SHIPPED | OUTPATIENT
Start: 2023-08-14 | End: 2023-12-04

## 2023-08-14 NOTE — TELEPHONE ENCOUNTER
----- Message from Neel Schmitz Mai, MD sent at 8/11/2023  4:14 PM CDT -----  Okay, please let him know that I will send in the prescription for the testosterone and please use it as prescribed.  Recheck the lab in 3 months.

## 2023-08-15 ENCOUNTER — TELEPHONE (OUTPATIENT)
Dept: FAMILY MEDICINE | Facility: CLINIC | Age: 63
End: 2023-08-15
Payer: COMMERCIAL

## 2023-08-15 DIAGNOSIS — R97.20 ELEVATED PROSTATE SPECIFIC ANTIGEN (PSA): ICD-10-CM

## 2023-08-15 DIAGNOSIS — L98.9 SKIN LESION: ICD-10-CM

## 2023-08-15 DIAGNOSIS — E29.1 HYPOGONADISM MALE: Primary | ICD-10-CM

## 2023-08-15 PROCEDURE — 99207 PR NO CHARGE NURSE ONLY: CPT | Performed by: FAMILY MEDICINE

## 2023-08-15 NOTE — TELEPHONE ENCOUNTER
PA needed on testosterone   Insurance paid/ [employer]  Ins. Phone: [phone]  Patient ID: [id number]  PCN: n/a  JUANA [id number]   Group: [id number]    Please let us know when PA is granted/denied. Thank You      [facility], [place]

## 2023-08-17 NOTE — TELEPHONE ENCOUNTER
Spoke with patient and informed of note below. Patient understood.   Closing encounter.   Frances Arnold MA

## 2023-08-17 NOTE — TELEPHONE ENCOUNTER
PA Initiation    Medication: TESTOSTERONE CYPIONATE 200 MG/ML IM SOLN  Insurance Company: [employer] Non-Specialty PA's - Phone [phone] Fax [phone]  Pharmacy Filling the Rx: [address]   Filling Pharmacy Phone: [phone]  Filling Pharmacy Fax: [phone]  Start Date: 8/17/2023

## 2023-08-22 NOTE — TELEPHONE ENCOUNTER
Spoke with plan, request has been received. Case is still under review. Determination ETA unavailable at this time.

## 2023-08-23 NOTE — TELEPHONE ENCOUNTER
Please let patient know that his health insurance did not cover for testosterone injection at this time.  He need to get another testosterone level check before they consider.  Labs ordered and encouraged to get it done in a week or 2.

## 2023-08-23 NOTE — TELEPHONE ENCOUNTER
PRIOR AUTHORIZATION DENIED    Medication: TESTOSTERONE CYPIONATE 200 MG/ML IM SOLN  Insurance Company: [employer] Non-Specialty PA's - Phone [phone] Fax [phone]  Denial Date: 8/23/2023  Denial Rational:     Appeal Information:     Patient Notified: No

## 2023-08-23 NOTE — TELEPHONE ENCOUNTER
No answer and patient does not have a voicemail box set up to leave a message.  Try back later.  Emma Guzman MA

## 2023-08-24 NOTE — TELEPHONE ENCOUNTER
Patients surgery with Dr Lira was moved to 9/6 can we addend the physical on 8/7 to work for his pre op otherwise can we schedule him for a pre op?

## 2023-08-31 ENCOUNTER — LAB (OUTPATIENT)
Dept: LAB | Facility: CLINIC | Age: 63
End: 2023-08-31
Payer: COMMERCIAL

## 2023-08-31 DIAGNOSIS — E29.1 HYPOGONADISM MALE: ICD-10-CM

## 2023-08-31 LAB — SHBG SERPL-SCNC: 29 NMOL/L (ref 11–80)

## 2023-08-31 PROCEDURE — 36415 COLL VENOUS BLD VENIPUNCTURE: CPT

## 2023-08-31 PROCEDURE — 84270 ASSAY OF SEX HORMONE GLOBUL: CPT

## 2023-08-31 PROCEDURE — 84403 ASSAY OF TOTAL TESTOSTERONE: CPT

## 2023-08-31 NOTE — TELEPHONE ENCOUNTER
Patient said that you said at the last visit he needed to see urology for elevated PSA and derm was for a lesion on his nose.     Patient is aware they will call him.  Please close encounter when done with orders.

## 2023-09-01 NOTE — TELEPHONE ENCOUNTER
Notes from office visit on 08/07/2023:      2.  Also has a lesion on the right nose been there for about a year.  It is getting bigger and has not healed completely for over a year.  Not crusted.  No pain or change its color  - has been picking on it.  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     Per phone call to patient on 08/08/2023:    Please let patient know that his labs showed his cholesterol looks pretty good, minimally elevated. No medication for now. Recommend to continue working on exercising, healthy diet and weight loss as discussed. Recheck in a year. His prostate enzyme level was slightly elevated. Not necessary that he has prostate cancer but further evaluation is recommended. Will refer him to new urologist for further evaluation. His kidney function, liver enzymes and electrolytes were normal with a hemoglobin A1c of 5.6 which was normal. No diabetes. His thyroid level was also normal. Will let him know once other lab results available.     Emma Guzman MA

## 2023-09-05 ENCOUNTER — ANESTHESIA EVENT (OUTPATIENT)
Dept: SURGERY | Facility: CLINIC | Age: 63
End: 2023-09-05
Payer: COMMERCIAL

## 2023-09-05 LAB
TESTOST FREE SERPL-MCNC: 2.66 NG/DL
TESTOST SERPL-MCNC: 133 NG/DL (ref 240–950)

## 2023-09-05 ASSESSMENT — LIFESTYLE VARIABLES: TOBACCO_USE: 1

## 2023-09-05 NOTE — ANESTHESIA PREPROCEDURE EVALUATION
Anesthesia Pre-Procedure Evaluation    Patient: You Abbott   MRN: [id number] : 1960        Procedure : Procedure(s):  EXCISION, MASS, BACK          Past Medical History:   Diagnosis Date     Pre-diabetes       Past Surgical History:   Procedure Laterality Date     right knee replacement       TONSILLECTOMY & ADENOIDECTOMY        No Known Allergies   Social History     Tobacco Use     Smoking status: Every Day     Packs/day: 1.00     Types: Cigarettes     Smokeless tobacco: Current     Tobacco comments:     Used to smoke up to 2 ppd. Smoking since 18 yo   Substance Use Topics     Alcohol use: Yes     Comment: couple a week - 3-4 beers each time      Wt Readings from Last 1 Encounters:   23 127 kg (280 lb)        Anesthesia Evaluation   Pt has had prior anesthetic. Type: General and Regional.        ROS/MED HX  ENT/Pulmonary:     (+) sleep apnea, doesn't use CPAP,              tobacco use, Current use, 1 packs/day, 50  Pack-Year Hx,                     Neurologic:  - neg neurologic ROS     Cardiovascular: Comment: Peripheral edema      METS/Exercise Tolerance:     Hematologic:  - neg hematologic  ROS     Musculoskeletal: Comment: Right TKA  (+)  arthritis,             GI/Hepatic:  - neg GI/hepatic ROS     Renal/Genitourinary: Comment: Elevated PSA - neg Renal ROS     Endo: Comment: Taking testosterone    (+)               Obesity,       Psychiatric/Substance Use:     (+) psychiatric history anxiety and depression       Infectious Disease:  - neg infectious disease ROS     Malignancy:  - neg malignancy ROS     Other:  - neg other ROS          Physical Exam    Airway        Mallampati: II   TM distance: > 3 FB   Neck ROM: full   Mouth opening: > 3 cm    Respiratory Devices and Support         Dental       (+) Minor Abnormalities - some fillings, tiny chips      Cardiovascular   cardiovascular exam normal       Rhythm and rate: regular and normal     Pulmonary   pulmonary exam normal        breath  sounds clear to auscultation       OUTSIDE LABS:  CBC:   Lab Results   Component Value Date    WBC 6.3 07/14/2023    HGB 15.2 07/14/2023    HCT 45.2 07/14/2023     07/14/2023     BMP:   Lab Results   Component Value Date     08/07/2023     07/14/2023    POTASSIUM 4.4 08/07/2023    POTASSIUM 4.4 07/14/2023    CHLORIDE 104 08/07/2023    CHLORIDE 104 07/14/2023    CO2 24 08/07/2023    CO2 23 07/14/2023    BUN 15.9 08/07/2023    BUN 18.5 07/14/2023    CR 0.74 08/07/2023    CR 0.74 07/14/2023     (H) 08/07/2023     (H) 07/14/2023     COAGS: No results found for: PTT, INR, FIBR  POC: No results found for: BGM, HCG, HCGS  HEPATIC:   Lab Results   Component Value Date    ALBUMIN 4.3 08/07/2023    PROTTOTAL 7.1 08/07/2023    ALT 23 08/07/2023    AST 16 08/07/2023    ALKPHOS 97 08/07/2023    BILITOTAL 0.5 08/07/2023     OTHER:   Lab Results   Component Value Date    A1C 5.6 08/07/2023    KALPANA 9.1 08/07/2023    TSH 2.46 08/07/2023       Anesthesia Plan    ASA Status:  2    NPO Status:  NPO Appropriate    Anesthesia Type: General.     - Airway: ETT   Induction: Intravenous, Propofol.   Maintenance: Balanced.        Consents    Anesthesia Plan(s) and associated risks, benefits, and realistic alternatives discussed. Questions answered and patient/representative(s) expressed understanding.     - Discussed: Risks, Benefits and Alternatives for BOTH SEDATION and the PROCEDURE were discussed     - Discussed with:  Patient      - Extended Intubation/Ventilatory Support Discussed: No.      - Patient is DNR/DNI Status: No     Use of blood products discussed: No .     Postoperative Care    Pain management: IV analgesics, Oral pain medications.   PONV prophylaxis: Ondansetron (or other 5HT-3), Dexamethasone or Solumedrol     Comments:    Other Comments: I have discussed all the risks and benefits of the anesthetic with the patient and they wish to proceed.             SHER More CRNA

## 2023-09-06 ENCOUNTER — HOSPITAL ENCOUNTER (OUTPATIENT)
Facility: CLINIC | Age: 63
Discharge: HOME | End: 2023-09-06
Attending: SPECIALIST | Admitting: SPECIALIST
Payer: COMMERCIAL

## 2023-09-06 ENCOUNTER — ANESTHESIA (OUTPATIENT)
Dept: SURGERY | Facility: CLINIC | Age: 63
End: 2023-09-06
Payer: COMMERCIAL

## 2023-09-06 VITALS
OXYGEN SATURATION: 91 % | TEMPERATURE: 97.9 F | DIASTOLIC BLOOD PRESSURE: 77 MMHG | HEART RATE: 77 BPM | SYSTOLIC BLOOD PRESSURE: 133 MMHG | RESPIRATION RATE: 16 BRPM

## 2023-09-06 DIAGNOSIS — G89.18 POST-OP PAIN: Primary | ICD-10-CM

## 2023-09-06 PROCEDURE — 250N000013 HC RX MED GY IP 250 OP 250 PS 637: Performed by: SPECIALIST

## 2023-09-06 PROCEDURE — 21933 EXC BACK TUM DEEP 5 CM/>: CPT | Performed by: SPECIALIST

## 2023-09-06 PROCEDURE — 710N000012 HC RECOVERY PHASE 2, PER MINUTE: Performed by: SPECIALIST

## 2023-09-06 PROCEDURE — 250N000009 HC RX 250: Performed by: NURSE ANESTHETIST, CERTIFIED REGISTERED

## 2023-09-06 PROCEDURE — 88304 TISSUE EXAM BY PATHOLOGIST: CPT | Mod: 26 | Performed by: PATHOLOGY

## 2023-09-06 PROCEDURE — 250N000025 HC SEVOFLURANE, PER MIN: Performed by: SPECIALIST

## 2023-09-06 PROCEDURE — 370N000017 HC ANESTHESIA TECHNICAL FEE, PER MIN: Performed by: SPECIALIST

## 2023-09-06 PROCEDURE — 710N000010 HC RECOVERY PHASE 1, LEVEL 2, PER MIN: Performed by: SPECIALIST

## 2023-09-06 PROCEDURE — 250N000011 HC RX IP 250 OP 636: Performed by: NURSE ANESTHETIST, CERTIFIED REGISTERED

## 2023-09-06 PROCEDURE — 360N000075 HC SURGERY LEVEL 2, PER MIN: Performed by: SPECIALIST

## 2023-09-06 PROCEDURE — 250N000011 HC RX IP 250 OP 636: Performed by: SPECIALIST

## 2023-09-06 PROCEDURE — 258N000003 HC RX IP 258 OP 636: Performed by: NURSE ANESTHETIST, CERTIFIED REGISTERED

## 2023-09-06 PROCEDURE — 88304 TISSUE EXAM BY PATHOLOGIST: CPT | Mod: TC | Performed by: SPECIALIST

## 2023-09-06 PROCEDURE — 999N000141 HC STATISTIC PRE-PROCEDURE NURSING ASSESSMENT: Performed by: SPECIALIST

## 2023-09-06 PROCEDURE — 250N000009 HC RX 250: Performed by: SPECIALIST

## 2023-09-06 PROCEDURE — 272N000001 HC OR GENERAL SUPPLY STERILE: Performed by: SPECIALIST

## 2023-09-06 RX ORDER — CEFAZOLIN SODIUM/WATER 3 G/30 ML
3 SYRINGE (ML) INTRAVENOUS
Status: COMPLETED | OUTPATIENT
Start: 2023-09-06 | End: 2023-09-06

## 2023-09-06 RX ORDER — ONDANSETRON 2 MG/ML
INJECTION INTRAMUSCULAR; INTRAVENOUS PRN
Status: DISCONTINUED | OUTPATIENT
Start: 2023-09-06 | End: 2023-09-06

## 2023-09-06 RX ORDER — PROPOFOL 10 MG/ML
INJECTION, EMULSION INTRAVENOUS CONTINUOUS PRN
Status: DISCONTINUED | OUTPATIENT
Start: 2023-09-06 | End: 2023-09-06

## 2023-09-06 RX ORDER — ONDANSETRON 2 MG/ML
4 INJECTION INTRAMUSCULAR; INTRAVENOUS EVERY 30 MIN PRN
Status: DISCONTINUED | OUTPATIENT
Start: 2023-09-06 | End: 2023-09-06 | Stop reason: HOSPADM

## 2023-09-06 RX ORDER — FENTANYL CITRATE 50 UG/ML
25 INJECTION, SOLUTION INTRAMUSCULAR; INTRAVENOUS EVERY 5 MIN PRN
Status: DISCONTINUED | OUTPATIENT
Start: 2023-09-06 | End: 2023-09-06 | Stop reason: HOSPADM

## 2023-09-06 RX ORDER — BUPIVACAINE HYDROCHLORIDE AND EPINEPHRINE 2.5; 5 MG/ML; UG/ML
INJECTION, SOLUTION INFILTRATION; PERINEURAL PRN
Status: DISCONTINUED | OUTPATIENT
Start: 2023-09-06 | End: 2023-09-06 | Stop reason: HOSPADM

## 2023-09-06 RX ORDER — DEXAMETHASONE SODIUM PHOSPHATE 4 MG/ML
INJECTION, SOLUTION INTRA-ARTICULAR; INTRALESIONAL; INTRAMUSCULAR; INTRAVENOUS; SOFT TISSUE PRN
Status: DISCONTINUED | OUTPATIENT
Start: 2023-09-06 | End: 2023-09-06

## 2023-09-06 RX ORDER — DIMENHYDRINATE 50 MG/ML
25 INJECTION, SOLUTION INTRAMUSCULAR; INTRAVENOUS
Status: DISCONTINUED | OUTPATIENT
Start: 2023-09-06 | End: 2023-09-06 | Stop reason: HOSPADM

## 2023-09-06 RX ORDER — SODIUM CHLORIDE, SODIUM LACTATE, POTASSIUM CHLORIDE, CALCIUM CHLORIDE 600; 310; 30; 20 MG/100ML; MG/100ML; MG/100ML; MG/100ML
INJECTION, SOLUTION INTRAVENOUS CONTINUOUS
Status: DISCONTINUED | OUTPATIENT
Start: 2023-09-06 | End: 2023-09-06 | Stop reason: HOSPADM

## 2023-09-06 RX ORDER — HYDROMORPHONE HYDROCHLORIDE 1 MG/ML
0.2 INJECTION, SOLUTION INTRAMUSCULAR; INTRAVENOUS; SUBCUTANEOUS EVERY 5 MIN PRN
Status: DISCONTINUED | OUTPATIENT
Start: 2023-09-06 | End: 2023-09-06 | Stop reason: HOSPADM

## 2023-09-06 RX ORDER — OXYCODONE AND ACETAMINOPHEN 5; 325 MG/1; MG/1
2 TABLET ORAL
Status: COMPLETED | OUTPATIENT
Start: 2023-09-06 | End: 2023-09-06

## 2023-09-06 RX ORDER — FENTANYL CITRATE 50 UG/ML
INJECTION, SOLUTION INTRAMUSCULAR; INTRAVENOUS PRN
Status: DISCONTINUED | OUTPATIENT
Start: 2023-09-06 | End: 2023-09-06

## 2023-09-06 RX ORDER — OXYCODONE HYDROCHLORIDE 5 MG/1
5-10 TABLET ORAL EVERY 6 HOURS PRN
Qty: 10 TABLET | Refills: 0 | Status: SHIPPED | OUTPATIENT
Start: 2023-09-06 | End: 2023-09-21

## 2023-09-06 RX ORDER — CEFAZOLIN SODIUM/WATER 3 G/30 ML
3 SYRINGE (ML) INTRAVENOUS SEE ADMIN INSTRUCTIONS
Status: DISCONTINUED | OUTPATIENT
Start: 2023-09-06 | End: 2023-09-06 | Stop reason: HOSPADM

## 2023-09-06 RX ORDER — LIDOCAINE HYDROCHLORIDE 20 MG/ML
INJECTION, SOLUTION INFILTRATION; PERINEURAL PRN
Status: DISCONTINUED | OUTPATIENT
Start: 2023-09-06 | End: 2023-09-06

## 2023-09-06 RX ORDER — FENTANYL CITRATE 50 UG/ML
50 INJECTION, SOLUTION INTRAMUSCULAR; INTRAVENOUS EVERY 5 MIN PRN
Status: DISCONTINUED | OUTPATIENT
Start: 2023-09-06 | End: 2023-09-06 | Stop reason: HOSPADM

## 2023-09-06 RX ORDER — ONDANSETRON 4 MG/1
4 TABLET, ORALLY DISINTEGRATING ORAL EVERY 30 MIN PRN
Status: DISCONTINUED | OUTPATIENT
Start: 2023-09-06 | End: 2023-09-06 | Stop reason: HOSPADM

## 2023-09-06 RX ORDER — KETOROLAC TROMETHAMINE 30 MG/ML
INJECTION, SOLUTION INTRAMUSCULAR; INTRAVENOUS PRN
Status: DISCONTINUED | OUTPATIENT
Start: 2023-09-06 | End: 2023-09-06

## 2023-09-06 RX ORDER — HYDROMORPHONE HYDROCHLORIDE 1 MG/ML
0.4 INJECTION, SOLUTION INTRAMUSCULAR; INTRAVENOUS; SUBCUTANEOUS EVERY 5 MIN PRN
Status: DISCONTINUED | OUTPATIENT
Start: 2023-09-06 | End: 2023-09-06 | Stop reason: HOSPADM

## 2023-09-06 RX ORDER — PROPOFOL 10 MG/ML
INJECTION, EMULSION INTRAVENOUS PRN
Status: DISCONTINUED | OUTPATIENT
Start: 2023-09-06 | End: 2023-09-06

## 2023-09-06 RX ADMIN — OXYCODONE HYDROCHLORIDE AND ACETAMINOPHEN 2 TABLET: 5; 325 TABLET ORAL at 11:28

## 2023-09-06 RX ADMIN — ONDANSETRON 4 MG: 2 INJECTION INTRAMUSCULAR; INTRAVENOUS at 10:13

## 2023-09-06 RX ADMIN — HYDROMORPHONE HYDROCHLORIDE 0.5 MG: 1 INJECTION, SOLUTION INTRAMUSCULAR; INTRAVENOUS; SUBCUTANEOUS at 09:45

## 2023-09-06 RX ADMIN — MIDAZOLAM 2 MG: 1 INJECTION INTRAMUSCULAR; INTRAVENOUS at 08:43

## 2023-09-06 RX ADMIN — PROPOFOL 80 MG: 10 INJECTION, EMULSION INTRAVENOUS at 10:04

## 2023-09-06 RX ADMIN — SUGAMMADEX 200 MG: 100 INJECTION, SOLUTION INTRAVENOUS at 10:23

## 2023-09-06 RX ADMIN — PHENYLEPHRINE HYDROCHLORIDE 200 MCG: 10 INJECTION INTRAVENOUS at 09:40

## 2023-09-06 RX ADMIN — LIDOCAINE HYDROCHLORIDE 1 ML: 10 INJECTION, SOLUTION EPIDURAL; INFILTRATION; INTRACAUDAL; PERINEURAL at 08:04

## 2023-09-06 RX ADMIN — SODIUM CHLORIDE, POTASSIUM CHLORIDE, SODIUM LACTATE AND CALCIUM CHLORIDE 10 ML/HR: 600; 310; 30; 20 INJECTION, SOLUTION INTRAVENOUS at 08:04

## 2023-09-06 RX ADMIN — KETOROLAC TROMETHAMINE 15 MG: 30 INJECTION, SOLUTION INTRAMUSCULAR at 10:13

## 2023-09-06 RX ADMIN — PROPOFOL 80 MG: 10 INJECTION, EMULSION INTRAVENOUS at 09:21

## 2023-09-06 RX ADMIN — DEXAMETHASONE SODIUM PHOSPHATE 5 MG: 4 INJECTION, SOLUTION INTRA-ARTICULAR; INTRALESIONAL; INTRAMUSCULAR; INTRAVENOUS; SOFT TISSUE at 09:01

## 2023-09-06 RX ADMIN — ROCURONIUM BROMIDE 50 MG: 50 INJECTION, SOLUTION INTRAVENOUS at 08:49

## 2023-09-06 RX ADMIN — FENTANYL CITRATE 50 MCG: 50 INJECTION, SOLUTION INTRAMUSCULAR; INTRAVENOUS at 11:13

## 2023-09-06 RX ADMIN — PROPOFOL 50 MCG/KG/MIN: 10 INJECTION, EMULSION INTRAVENOUS at 08:47

## 2023-09-06 RX ADMIN — FENTANYL CITRATE 100 MCG: 50 INJECTION, SOLUTION INTRAMUSCULAR; INTRAVENOUS at 08:48

## 2023-09-06 RX ADMIN — FENTANYL CITRATE 50 MCG: 50 INJECTION, SOLUTION INTRAMUSCULAR; INTRAVENOUS at 10:52

## 2023-09-06 RX ADMIN — LIDOCAINE HYDROCHLORIDE 50 MG: 20 INJECTION, SOLUTION INFILTRATION; PERINEURAL at 08:48

## 2023-09-06 RX ADMIN — PHENYLEPHRINE HYDROCHLORIDE 100 MCG: 10 INJECTION INTRAVENOUS at 09:37

## 2023-09-06 RX ADMIN — Medication 3 G: at 08:43

## 2023-09-06 RX ADMIN — PHENYLEPHRINE HYDROCHLORIDE 200 MCG: 10 INJECTION INTRAVENOUS at 10:03

## 2023-09-06 RX ADMIN — PHENYLEPHRINE HYDROCHLORIDE 100 MCG: 10 INJECTION INTRAVENOUS at 09:04

## 2023-09-06 RX ADMIN — PROPOFOL 200 MG: 10 INJECTION, EMULSION INTRAVENOUS at 08:48

## 2023-09-06 RX ADMIN — ROCURONIUM BROMIDE 20 MG: 50 INJECTION, SOLUTION INTRAVENOUS at 09:21

## 2023-09-06 ASSESSMENT — ACTIVITIES OF DAILY LIVING (ADL)
ADLS_ACUITY_SCORE: 35
ADLS_ACUITY_SCORE: 35

## 2023-09-06 NOTE — TELEPHONE ENCOUNTER
Attempted to reach patient by phone. Unable to leave voicemail due to it not being set up. Will try again later.   Surekha Alberts MA 9/6/2023

## 2023-09-06 NOTE — ANESTHESIA POSTPROCEDURE EVALUATION
Patient: You Abbott    Procedure: Procedure(s):  EXCISION, MASS, RIGHT BACK       Anesthesia Type:  General    Note:  Disposition: Outpatient   Postop Pain Control:    PONV: No   Neuro/Psych: Uneventful            Sign Out: Acceptable/Baseline neuro status   Airway/Respiratory: Uneventful            Sign Out: Acceptable/Baseline resp. status   CV/Hemodynamics: Uneventful            Sign Out: Acceptable CV status; No obvious hypovolemia; No obvious fluid overload   Other NRE: NONE   DID A NON-ROUTINE EVENT OCCUR? No       Last vitals:  Vitals Value Taken Time   /79 09/06/23 1130   Temp 98.06  F (36.7  C) 09/06/23 1136   Pulse 76 09/06/23 1135   Resp 14 09/06/23 1135   SpO2 98 % 09/06/23 1135   Vitals shown include unvalidated device data.    Electronically Signed By: SHER More CRNA  September 6, 2023  2:27 PM

## 2023-09-06 NOTE — TELEPHONE ENCOUNTER
----- Message from Neel Schmitz Mai, MD sent at 9/6/2023  8:06 AM CDT -----  Please let patient know that his testosterone level remains to be low, recommend to be treated.

## 2023-09-06 NOTE — TELEPHONE ENCOUNTER
Urology was referred to.  Please help him to set up an appointment with Dr. Gann for the lesion on his face as he also being for the mass on his neck.  He did not need to see dermatologist for that.

## 2023-09-06 NOTE — DISCHARGE INSTRUCTIONS
[facility] Same-Day Surgery Anesthesia  Adult Discharge Orders & Instructions     For 24 hours after surgery    Get plenty of rest.  A responsible adult must stay with you for at least 24 hours after you leave the hospital.   Do not drive or use heavy equipment.  If you have weakness or tingling, don't drive or use heavy equipment until this feeling goes away.  Do not drink alcohol.  Avoid strenuous or risky activities.  Ask for help when climbing stairs.   You may feel lightheaded.  If so, sit for a few minutes before standing.  Have someone help you get up.   You may have a slight fever. Call the doctor if your fever is over 100 F (37.7 C) (taken under the tongue) or lasts longer than 24 hours.  You may have a dry mouth, a sore throat, muscle aches or trouble sleeping.  These should go away after 24 hours.  Do not make important or legal decisions.  We don t expect you to have any problems from the surgery or treatment you had today. Just in case, here s what to do if you have pain, upset stomach (nausea), bleeding or infection:  Pain:  Take medicines your doctor has prescribed or over-the-counter medicine they have suggested. Resting and using ice packs can help, too. For surgery on an arm or leg, raise it on a pillow to ease swelling. Call your doctor if these methods don t work.  Copyright Darius Colbert, Licensed under [employer]  Upset stomach (nausea):  Take anti-nausea medicine approved by your doctor. Drink clear liquids like apple juice, ginger ale, broth or 7-Up. Be sure to drink enough fluids. Rest can help, too. Move to normal foods when you re ready.   Bleeding:  In the first 24 hours, you may see a little blood on your dressing, about the size of a quarter. You don t need to worry about this much blood, but if the blood spot keeps getting bigger:  Put pressure on the wound if you can, AND  Call your doctor.  Copyright [employer], Licensed under [employer]  Fever/Infection:  Please call your doctor if you have any of these signs:  Redness  Swelling  Wound feels warm  Pain gets worse  Bad-smelling fluid leaks from wound  Fever or chills  Call your doctor for any of the followin.  It has been over 8 to 10 hours since surgery and you are still not able to urinate (pass water).    2.  Headache for over 24 hours.    3.  Numbness, tingling or weakness in your legs the day after surgery (if you had spinal anesthesia).    24 Hour Nurse advice line: [phone]

## 2023-09-06 NOTE — OP NOTE
[facility] Operative Note    Pre-operative diagnosis: Lipoma of neck [D17.0]  Lipoma of back [D17.1]   Post-operative diagnosis: Intramuscular adipose tissue mass measuring 12 x 12 x 3.5 cm   Procedure: Procedure(s):  EXCISION, MASS, BACK/neck intramuscular adipose tissue mass   Surgeon: Malcolm Gann MD, FACS   Assistant(s): None   Anesthesia: General endotracheal anesthesia   Estimated blood loss: Less than 50 ml   Total IV fluids: (See anesthesia record)   Blood transfusion: No transfusion was given during surgery   Total urine output: None   Drains: Lorenzo-Werner   Specimens: None   Implants: None   Findings: 12 x 12 x 3.5 cm intramuscular adipose tissue mass, probable lipoma   Complications: None   Condition: Stable   Comments: Indications for procedure: This is a 63-year-old gentleman with a slowly enlarging mass of his right upper back/neck.  It was imaged years ago and consistent with a lipoma.  He was going to have it out pre-COVID but COVID hit and he had to reschedule.  Over that several years it slowly enlarged.  On imaging it was consistent lipoma but intramuscular.  Because of the increasing size he opted to have it excised.         Details procedure:  With the cooperation the patient.  Holding area the mass was marked.  He was then taken to the operating room and after induction anesthesia was placed in the left lateral decubitus position.  The right neck and back were prepped and draped in a sterile fashion.  Timeout was performed confirm the day and the patient was procedure performed.  A more posterior longitudinal incision was then made.  Subcutaneous tissue was opened and cautery.  We dissected down to the muscle muscle-splitting technique was used to open the trapezius muscle.  Continue muscle-splitting technique there was some tearing of the muscle due to the tightness.  We then to identify the adipose tissue mass.  Was then we then began dissecting it free using Metzenbaum scissors.   This was carried out partially there are also some septations of muscle which were taken down with cautery.  After some extensive dissection with a combination of sharp with Metzenbaum scissors blunt with finger and cautery to free up any attached muscle the mass was freed up from the space.  It was submitted with the measurements as stated above.  Because of the size of the cavity it was elected to leave a #10 Lorenzo-Werner drain in place.  A separate stab incision was then made posterior lateral.  And a Lorenzo-Werner drain was brought through there and placed into the space between the muscle and the chest wall.  Some of the torn muscle was then reapproximated using 0 Vicryl in a horizontal mattress fashion.  Prior to this the wound was copiously irrigated.  Subcutaneous tissue was reapproximated using 3-0 Vicryl.  Skin is closed in a running 4-0 Monocryl subcuticular stitch.  An 0 silk was used to hold the drain in place.  Patient was then taken from the operative to recovery in stable condition to be sent home.    Malcolm Gann MD, FACS

## 2023-09-06 NOTE — ANESTHESIA PROCEDURE NOTES
Airway       Patient location during procedure: OR       Procedure Start/Stop Times: 9/6/2023 8:52 AM  Staff -        CRNA: Shane Miles APRN CRNA       Other Anesthesia Staff: Ria Hammond       Performed By: SRNA  Consent for Airway        Urgency: elective  Indications and Patient Condition       Indications for airway management: roz-procedural       Induction type:intravenous       Mask difficulty assessment: 2 - vent by mask + OA or adjuvant +/- NMBA    Final Airway Details       Final airway type: endotracheal airway       Successful airway: ETT - single and Oral  Endotracheal Airway Details        ETT size (mm): 8.0       Cuffed: yes       Successful intubation technique: direct laryngoscopy       DL Blade Type: [place] 3       Grade View of Cords: 2       Adjucts: stylet       Position: Right       Measured from: lips       Secured at (cm): 23       Bite block used: None    Post intubation assessment        Placement verified by: capnometry, equal breath sounds and chest rise        Number of attempts at approach: 1       Number of other approaches attempted: 0       Secured with: plastic tape       Ease of procedure: easy       Dentition: Intact and Unchanged    Medication(s) Administered   Medication Administration Time: 9/6/2023 8:52 AM

## 2023-09-06 NOTE — ANESTHESIA CARE TRANSFER NOTE
Patient: You Abbott    Procedure: Procedure(s):  EXCISION, MASS, BACK       Diagnosis: Lipoma of neck [D17.0]  Lipoma of back [D17.1]  Diagnosis Additional Information: No value filed.    Anesthesia Type:   General     Note:    Oropharynx: oropharynx clear of all foreign objects  Level of Consciousness: awake  Oxygen Supplementation: face mask  Level of Supplemental Oxygen (L/min / FiO2): 8  Independent Airway: airway patency satisfactory and stable  Dentition: dentition unchanged  Vital Signs Stable: post-procedure vital signs reviewed and stable    Patient transferred to: PACU    Handoff Report: Identified the Reponsible Provider, Reviewed the pertinent medical history, Discussed the surgical course, Reviewed Intra-OP anesthesia mangement and issues during anesthesia, Set expectations for post-procedure period, Allowed opportunity for questions and acknowledgement of understanding and Identifed the Patient  Vitals:  Vitals Value Taken Time   BP     Temp     Pulse     Resp 22 09/06/23 1034   SpO2 97 % 09/06/23 1034   Vitals shown include unvalidated device data.    Electronically Signed By: SHER More CRNA  September 6, 2023  10:35 AM

## 2023-09-07 NOTE — TELEPHONE ENCOUNTER
2nd attempt Attempted to reach patient by phone. Unable to leave voicemail due to it not being set up. Will try again later.   Sending letter  Surekha Alberts MA 9/7/2023

## 2023-09-08 ENCOUNTER — LAB (OUTPATIENT)
Dept: FAMILY MEDICINE | Facility: CLINIC | Age: 63
End: 2023-09-08

## 2023-09-08 ENCOUNTER — HOSPITAL ENCOUNTER (OUTPATIENT)
Dept: GENERAL RADIOLOGY | Facility: CLINIC | Age: 63
Discharge: HOME | End: 2023-09-08
Attending: FAMILY MEDICINE | Admitting: FAMILY MEDICINE
Payer: COMMERCIAL

## 2023-09-08 ENCOUNTER — OFFICE VISIT (OUTPATIENT)
Dept: FAMILY MEDICINE | Facility: CLINIC | Age: 63
End: 2023-09-08
Payer: COMMERCIAL

## 2023-09-08 VITALS
TEMPERATURE: 96.5 F | SYSTOLIC BLOOD PRESSURE: 120 MMHG | OXYGEN SATURATION: 100 % | HEIGHT: 70 IN | DIASTOLIC BLOOD PRESSURE: 60 MMHG | BODY MASS INDEX: 40.37 KG/M2 | HEART RATE: 72 BPM | WEIGHT: 282 LBS | RESPIRATION RATE: 18 BRPM

## 2023-09-08 DIAGNOSIS — M54.12 CERVICAL RADICULOPATHY: ICD-10-CM

## 2023-09-08 DIAGNOSIS — D48.5 NEOPLASM OF UNCERTAIN BEHAVIOR OF SKIN: ICD-10-CM

## 2023-09-08 DIAGNOSIS — F41.1 GENERALIZED ANXIETY DISORDER: Primary | ICD-10-CM

## 2023-09-08 DIAGNOSIS — L91.8 SKIN TAG: ICD-10-CM

## 2023-09-08 DIAGNOSIS — L57.0 ACTINIC KERATOSIS: ICD-10-CM

## 2023-09-08 DIAGNOSIS — R97.20 ELEVATED PROSTATE SPECIFIC ANTIGEN (PSA): ICD-10-CM

## 2023-09-08 DIAGNOSIS — D17.30 LIPOMA OF SKIN AND SUBCUTANEOUS TISSUE: ICD-10-CM

## 2023-09-08 DIAGNOSIS — Z72.0 TOBACCO ABUSE DISORDER: ICD-10-CM

## 2023-09-08 DIAGNOSIS — Z12.11 SCREEN FOR COLON CANCER: ICD-10-CM

## 2023-09-08 DIAGNOSIS — R79.89 LOW TESTOSTERONE IN MALE: ICD-10-CM

## 2023-09-08 DIAGNOSIS — F33.41 RECURRENT MAJOR DEPRESSIVE DISORDER, IN PARTIAL REMISSION (H): ICD-10-CM

## 2023-09-08 LAB
PATH REPORT.COMMENTS IMP SPEC: NORMAL
PATH REPORT.COMMENTS IMP SPEC: NORMAL
PATH REPORT.FINAL DX SPEC: NORMAL
PATH REPORT.GROSS SPEC: NORMAL
PATH REPORT.MICROSCOPIC SPEC OTHER STN: NORMAL
PATH REPORT.RELEVANT HX SPEC: NORMAL
PHOTO IMAGE: NORMAL
PSA SERPL DL<=0.01 NG/ML-MCNC: 4.38 NG/ML (ref 0–4.5)

## 2023-09-08 PROCEDURE — 99214 OFFICE O/P EST MOD 30 MIN: CPT | Mod: 25 | Performed by: FAMILY MEDICINE

## 2023-09-08 PROCEDURE — G0103 PSA SCREENING: HCPCS | Performed by: FAMILY MEDICINE

## 2023-09-08 PROCEDURE — 90682 RIV4 VACC RECOMBINANT DNA IM: CPT | Performed by: FAMILY MEDICINE

## 2023-09-08 PROCEDURE — 90471 IMMUNIZATION ADMIN: CPT | Performed by: FAMILY MEDICINE

## 2023-09-08 PROCEDURE — 36415 COLL VENOUS BLD VENIPUNCTURE: CPT | Performed by: FAMILY MEDICINE

## 2023-09-08 PROCEDURE — 99406 BEHAV CHNG SMOKING 3-10 MIN: CPT | Mod: 59 | Performed by: FAMILY MEDICINE

## 2023-09-08 PROCEDURE — 72040 X-RAY EXAM NECK SPINE 2-3 VW: CPT

## 2023-09-08 RX ORDER — GABAPENTIN 100 MG/1
CAPSULE ORAL
Qty: 120 CAPSULE | Refills: 0 | Status: SHIPPED | OUTPATIENT
Start: 2023-09-08 | End: 2023-12-04

## 2023-09-08 RX ORDER — CYCLOBENZAPRINE HCL 10 MG
10 TABLET ORAL 2 TIMES DAILY PRN
Qty: 60 TABLET | Refills: 1 | Status: SHIPPED | OUTPATIENT
Start: 2023-09-08 | End: 2023-12-04

## 2023-09-08 RX ORDER — ESCITALOPRAM OXALATE 20 MG/1
20 TABLET ORAL DAILY
Qty: 30 TABLET | Refills: 0 | Status: SHIPPED | OUTPATIENT
Start: 2023-09-08 | End: 2024-01-30

## 2023-09-08 RX ORDER — OXYCODONE AND ACETAMINOPHEN 5; 325 MG/1; MG/1
1 TABLET ORAL EVERY 6 HOURS PRN
Qty: 30 TABLET | Refills: 0 | Status: SHIPPED | OUTPATIENT
Start: 2023-09-08 | End: 2023-09-21

## 2023-09-08 RX ORDER — BUPROPION HYDROCHLORIDE 150 MG/1
150 TABLET ORAL EVERY MORNING
Qty: 30 TABLET | Refills: 0 | Status: SHIPPED | OUTPATIENT
Start: 2023-09-08 | End: 2024-01-30

## 2023-09-08 ASSESSMENT — ANXIETY QUESTIONNAIRES
5. BEING SO RESTLESS THAT IT IS HARD TO SIT STILL: NOT AT ALL
IF YOU CHECKED OFF ANY PROBLEMS ON THIS QUESTIONNAIRE, HOW DIFFICULT HAVE THESE PROBLEMS MADE IT FOR YOU TO DO YOUR WORK, TAKE CARE OF THINGS AT HOME, OR GET ALONG WITH OTHER PEOPLE: NOT DIFFICULT AT ALL
6. BECOMING EASILY ANNOYED OR IRRITABLE: NOT AT ALL
3. WORRYING TOO MUCH ABOUT DIFFERENT THINGS: NOT AT ALL
2. NOT BEING ABLE TO STOP OR CONTROL WORRYING: NOT AT ALL
1. FEELING NERVOUS, ANXIOUS, OR ON EDGE: SEVERAL DAYS
4. TROUBLE RELAXING: NOT AT ALL
7. FEELING AFRAID AS IF SOMETHING AWFUL MIGHT HAPPEN: NOT AT ALL
GAD7 TOTAL SCORE: 1
GAD7 TOTAL SCORE: 1

## 2023-09-08 ASSESSMENT — PAIN SCALES - GENERAL: PAINLEVEL: EXTREME PAIN (8)

## 2023-09-08 NOTE — PROGRESS NOTES
Assessment & Plan     (F41.1) Generalized anxiety disorder  (primary encounter diagnosis)  (F33.41) Recurrent major depressive disorder, in partial remission (H)  Comment: Known to have depression and anxiety for years which tolerable for years without medication or counseling.  He learned to live with them.  His depression and anxiety have gotten significantly worse in the last several months since moving to [place] and his correction.  Feels more anxious than depression.  Did not tolerate Prozac and Wellbutrin in the past.  No drugs or alcohol.     Was seen for them a month ago and was started on Lexapro and BuSpar which have helped with no side effect.  He has a hard time remembering taking the BuSpar twice a day.  Still having a lot of anxiety with little energy or motivation.  No suicidal or homicidal ideation.  No hallucination.  Still has a lot of stress in his life    Discussed with him about the treatment options again.  Continue to decline counseling.  Will stop the BuSpar, continue with the Lexapro, but increase to 20 mg daily.  Also start him on the Wellbutrin as an adjunctive treatment.  Stated he tolerated Wellbutrin in the past.  Follow-up virtually in a month, earlier with any concerns or question.    Symptoms that need to be seen or call and discuss.  It was also instructed to call 911 or go to the emergency room if develop suicidal or homicidal ideation, thought or intention.    Plan: escitalopram (LEXAPRO) 20 MG tablet, buPROPion         (WELLBUTRIN XL) 150 MG 24 hr tablet            (R79.89) Low testosterone in male  Comment: His testosterone level is low x2.  He was told of low testosterone when he was in [place].  He has been quite symptomatic with fatigue and low libido.  His PSA was slightly elevated last month.  No symptoms of PSA and no history of elevated PSA.  I discussed with him about testosterone replacement therapy and its potential complication which include but not limited to  BPH, polycythemia and increased CAD risk.  With the elevated PSA, I am somewhat hesitant to start him on the testosterone replacement therapy and he agreed to hold off for now as well.  He is scheduled to see a urologist in couple months.  Recheck the PSA level today.    His PSA level was normal today.  Will hold off on the testosterone replacement therapy as he would like to discuss this with the urologist.  In the meantime, he was strongly encouraged to work on weight loss.      (R97.20) Elevated prostate specific antigen (PSA)  Comment: Likely is for positive due to the labs high sensitivity level.  Recheck the PSA today and it was indeed normal.  He has no symptoms BPH.  He was given the option to cancel the urology appointment, recheck the PSA level in a year.    Plan: PSA, screen           (D17.30) Lipoma of skin and subcutaneous tissue  Comment: The lipoma was removed 2 days ago.  I reviewed with the surgeon's procedure note, it was much deeper and bigger than anticipated.  Has the drains in place and is still quite a bit of fluid - 40 ml in the last 12 hrs - light pinkish fluid, nonpurulent.  The wound is healing nicely, no sign of infection.  Pain is high.  He was given 10 tablets of oxycodone which he used up in the last couple day.  He looked quite uncomfortable.    Continue with the wound care.  Symptoms that need to be seen or call in and discuss.  Instructed follow-up with the surgeon closely as per his/her recommendation.  Once he is okay to have the drain removed by his surgeon, he can either call this office or the surgeons office for its removal    Encouraged to continue with normal activity as tolerated.  Oxycodone was refilled to be taken as needed for severe pain.  He was informed that is not intended for long-term treatment.  He also has chronic pain which has gotten worse since the surgery.      Plan: oxyCODONE-acetaminophen (PERCOCET) 5-325 MG         tablet            (M54.12) Cervical  radiculopathy  Comment: Stated that he is known to have severe arthritis in her neck and was recommended cervical spine fusion when her in [place].  He did not feel comfortable with the surgery.  Failed epidural injections.  He was recommended physical therapy but never had a chance to do due to work schedule conflict.  He was on gabapentin which helped.  Been having more pain especially in the last couple days after the surgery.  Also been having a tingling and burning pain that radiated to the shoulder and the clavicle area, typical distribution of pain that he has had.  Stated the last MRI was over a year ago.    Exam today was significant pain with patient to the cervical spine.  The left trapezius muscle was tight and spastic.  No weakness on the left hand and there was no focal neurological deficit.      Discussed with him about the nature of the condition.  Get the x-ray today which as expected, no acute concerns or concerning finding identified.  Discussed about physical therapy but he wanted to hold off until the wound from the lipomectomy wound healed completely.  Also discussed about getting an MRI but he is not interested to pursue any invasive interventions and therefore he is okay to hold off for now.    He was given 30 tablets of oxycodone to be taken as needed for severe pain.  Again, he was informed is not an intended for long-term treatment.  Also start him on a gabapentin and Flexeril, both of which he tolerated in the past.  Potential side effect also discussed.    Symptoms and need to be seen and call in also discussed.    Plan: gabapentin (NEURONTIN) 100 MG capsule,         oxyCODONE-acetaminophen (PERCOCET) 5-325 MG         tablet, XR Cervical Spine 2/3 Views,         cyclobenzaprine (FLEXERIL) 10 MG tablet            (D48.5) Neoplasm of uncertain behavior of skin  (L57.0) Actinic keratosis  (L91.8) Skin tag  Comment: He requested to be referred to dermatology.  He saw dermatologist in [place]  and was treated for precancerous cells and was recommended to follow-up yearly.  He has not seen one for a long time.  He also has a skin tag on the left gluteal area that was excised but then came back and is getting bigger.  I did not find any concerned lesions on the exam today.  As per his request, he was referred to dermatology for a complete skin exam.  He is at higher risk for having skin cancer as he was working in the sun for many years in [place].    Plan: Adult Dermatology Referral            (Z72.0) Tobacco abuse disorder  Comment: .You has been smoking for years.  Discussed with him about the long and short term consequences of tobacco smoking.  Discussed with him about different options for smoking cessation aids.  He is not ready to quit smoking at this time.  Encourage to let me know when he is ready to quit.  In a mean time, I encourage him to reduce to amount of cigarrets smoke as much as possible.  All of his questions were answered.     Plan: SMOKING CESSATION COUNSELING 3-10 MIN            (Z12.11) Screen for colon cancer  Comment: Discussed with him about options for colon cancer screening which include colonoscopy, Cologuard or FIT test.  Pros and cons of each option discussed.  He preferred Cologuard.  He understands that positive Cologuard test will need further evaluation with likely colonoscopy.  He also informed that normal Cologuard to be repeated every 3 years.     Plan: COLOGUARD(EXACT SCIENCES)            (Z23) Need for prophylactic vaccination and inoculation against influenza  Comment: No contraindication indicated.  Side effect discussed and otc med for symptomatic treatment.          Work on weight loss  Regular exercise    Neel Schmitz Mai, MD  [facility]    Alma Tavera is a 63 year old, presenting for the following health issues:  Depression and Shoulder Pain      9/8/2023     8:46 AM   Additional Questions   Roomed by Sarah QUIJANO       History of  Present Illness       Mental Health Follow-up:  Patient presents to follow-up on Depression & Anxiety.Patient's depression since last visit has been:  Better  The patient is not having other symptoms associated with depression.  Patient's anxiety since last visit has been:  Better  The patient is not having other symptoms associated with anxiety.  Any significant life events: health concerns  Patient is not feeling anxious or having panic attacks.  Patient has no concerns about alcohol or drug use.    Reason for visit:  Shoulder and depression    He eats 0-1 servings of fruits and vegetables daily.He consumes 0 sweetened beverage(s) daily.He exercises with enough effort to increase his heart rate 9 or less minutes per day.  He exercises with enough effort to increase his heart rate 3 or less days per week. He is missing 1 dose(s) of medications per week.  He is not taking prescribed medications regularly due to remembering to take.       Liang was seen today for couple concerns.  First is to follow-up on his depression and anxiety which he has had for years and thought that it was asked to attribute it to stress.  Was on Prozac and Wellbutrin but did not like the side effect.   Stated that he took the Wellbutrin for tobacco cessation and therefore not be sure how he felt was due to the Wellbutrin.  He was started on the Lexapro and BuSpar at the last visit about a month ago.  He tolerated them well but having trouble with remembering taking the BuSpar multiple times a day.  The Lexapro l has helped.  Still has anxiety and his energy is low.  Depression is not controlled.  No suicidal homicidal ideation.  No hallucination.    He had a lipoma removed 2 days ago with no complication.  It was much bigger than anticipated and therefore has a drain in place.  Still draining about 40 mL of pinkish fluid in the last 24 hours.  The pain is high, was given 10 tabs oxycodone and used them all up in the last 2 days.  No fever or  "chills.  Was told that not to remove the drain until the drainage is less than 25 cc in 24 hours.  Very uncomfortable and painful, keeping him awake at night.  He needs help with the pain control.  The oxycodone has helped.    His neck pain also has gotten worse since the surgery.  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  He did not follow-up with the recommendation because he was not ready for having the surgery.  Same kind of pain that he has had.  Muscle feels tight.  Burning pain that radiated to the shoulder and to the along the clavicle, typical pain distribution that he has had.  No weakness in the arm.  No fever or chills.    He also requested to be referred to dermatologist.  States that he was in the sun for most of his life as he works outside in [place].  He saw dermatologist several times once he was in [place] and had multiple recent freeway frozen and removed.  It was recommended to follow-up at least annually for skin check.  Last dermatology appointment was several couple years ago.  He has a lesion on his left gluteal area that we will remove.  It is coming back and getting bigger.  No pain or irritation.  No family history of skin cancer.        Review of Systems   Constitutional, HEENT, cardiovascular, pulmonary, GI, , musculoskeletal, neuro, skin, endocrine and psych systems are negative, except as otherwise noted.      Objective    /60   Pulse 72   Temp (!) 96.5  F (35.8  C) (Temporal)   Resp 18   Ht 1.778 m (5' 10\")   Wt 127.9 kg (282 lb)   SpO2 100%   BMI 40.46 kg/m    Body mass index is 40.46 kg/m .  Physical Exam   GENERAL: alert and no distress, but looks very uncomfortable.  He was guarding his right shoulder.  HENT: ear canals and TM's normal.  Nares are non-congested. Oropharynx is pink and moist. No tender with palpation to the sinuses.   NECK: Supple.  Neck is stiff, tender with " patient to the lower cervical spine and is paraspinous muscle  RESP: lungs clear to auscultation - no rales, rhonchi or wheezes  CV: regular rate and rhythm, no murmur,  MS: no gross musculoskeletal defects noted.  Both arms are equally in strength.  No tender with palpation to left shoulder joint.  SKIN: no suspicious lesions or rashes.  Incision on the right shoulder at the base of the neck is clean and dry.  The drain is in place with about 5 cc of light pinkish nonpurulent fluid noted.  No warmth to the touch.  NEURO: Normal strength and tone, mentation intact and speech normal.  No focal neurological deficit.  PSYCH: mentation appears normal, affect normal/bright.  Thoughts are intact.    Results for orders placed or performed during the hospital encounter of 09/08/23   XR Cervical Spine 2/3 Views     Status: None    Narrative    XR CERVICAL SPINE 2/3 VIEWS 9/8/2023 10:34 AM     HISTORY: Cervical radiculopathy    COMPARISON: None.       Impression    IMPRESSION: Vertebral body heights are maintained. No significant  anterolisthesis or retrolisthesis. Lateral masses are symmetric. No  significant prevertebral edema. Multilevel facet disease particularly  in the inferior cervical spine.     EDWIN ANGEL MD         SYSTEM ID:  [id number]   Results for orders placed or performed in visit on 09/08/23   PSA, screen     Status: Normal   Result Value Ref Range    Prostate Specific Antigen Screen 4.38 0.00 - 4.50 ng/mL    Narrative    This result is obtained using the Roche Elecsys total PSA method on the meme e601 immunoassay analyzer. Results obtained with different assay methods or kits cannot be used interchangeably.

## 2023-09-12 ENCOUNTER — ALLIED HEALTH/NURSE VISIT (OUTPATIENT)
Dept: FAMILY MEDICINE | Facility: CLINIC | Age: 63
End: 2023-09-12
Payer: COMMERCIAL

## 2023-09-12 ENCOUNTER — TELEPHONE (OUTPATIENT)
Dept: SURGERY | Facility: CLINIC | Age: 63
End: 2023-09-12
Payer: COMMERCIAL

## 2023-09-12 DIAGNOSIS — Z48.03 CHANGE OR REMOVAL OF DRAINS: Primary | ICD-10-CM

## 2023-09-12 PROCEDURE — 99207 PR NO CHARGE NURSE ONLY: CPT

## 2023-09-12 NOTE — TELEPHONE ENCOUNTER
Patient called the clinic with concerns of decreased drainage from his DERRICK drain. His DERRICK drain was placed on 9/6 due to the size of cavity following a lipoma mass removal on his right back.     Patient stated he had 15cc in the drainage bulb yesterday morning. Since then the drainage bulb has been empty; however, he has been having leaking around his tube. About a half dollar sized spot of drainage was on his pillow this morning. He also feels wetness running down his back. Patient confirms there is still adequate suction to the drainage tube.    While on the phone with the patient, writer had patient's significant other assess DERRICK drain site. The area is slightly red. There's no swelling, warmth to touch, or purulent odor; however, there is pussy drainage that is greenish yellow. Patient denies fever, chills, night sweats. He states he has had increased sleepiness yesterday, which was not his normal.       Pharmacy: [facility]  No known allergies to antibiotics.     Routing to Dr. Gann to advise. Should patient be started on an antibiotic? Should patient have a nurse visit to remove tube? Or should patient be placed on Dr. Gann's schedule on Thursday (9/14).     Greer Godfrey RN on 9/12/2023 at 9:57 AM

## 2023-09-12 NOTE — PROGRESS NOTES
DRAIN REMOVAL     Patient seen per the order of Dr. Gann.     Drain:  Location: Right upper back/shoulder    Type: DERRICK    Amount of drainage present prior to removal: 10 mL     Action & Treatment order per doctor: Previous dressing was removed noting a moderate amount of drainage. Drain was removed without incident and tubing was intact upon removal. Patient tolerated well. Drain site was cleansed with soap and water. Applied ABD pad due to drainage over drain site. Educated patient he can change to gauze if drainage lightens up.    Patient verbalized understanding of treatment plan.     Follow up: Dr. Gann on 9/21     Greer Godfrey RN on 9/12/2023 at 12:53 PM

## 2023-09-12 NOTE — TELEPHONE ENCOUNTER
Patient was added on to the RN schedule for a DERRICK drain removal and assessment.     Dr. Gann will be on site if writer has any concerns.     Greer Godfrey RN on 9/12/2023 at 10:07 AM

## 2023-09-21 ENCOUNTER — TELEPHONE (OUTPATIENT)
Dept: SURGERY | Facility: CLINIC | Age: 63
End: 2023-09-21

## 2023-09-21 ENCOUNTER — OFFICE VISIT (OUTPATIENT)
Dept: SURGERY | Facility: CLINIC | Age: 63
End: 2023-09-21
Payer: COMMERCIAL

## 2023-09-21 VITALS
BODY MASS INDEX: 40.46 KG/M2 | DIASTOLIC BLOOD PRESSURE: 82 MMHG | TEMPERATURE: 96.3 F | SYSTOLIC BLOOD PRESSURE: 132 MMHG | WEIGHT: 282 LBS

## 2023-09-21 DIAGNOSIS — Z98.890 POST-OPERATIVE STATE: Primary | ICD-10-CM

## 2023-09-21 DIAGNOSIS — K42.0 INCARCERATED UMBILICAL HERNIA: ICD-10-CM

## 2023-09-21 PROCEDURE — 99213 OFFICE O/P EST LOW 20 MIN: CPT | Mod: 24 | Performed by: SPECIALIST

## 2023-09-21 ASSESSMENT — PAIN SCALES - GENERAL: PAINLEVEL: MODERATE PAIN (5)

## 2023-09-21 NOTE — TELEPHONE ENCOUNTER
Type of surgery: HERNIORRHAPHY, UMBILICAL, ROBOT-ASSISTED, LAPAROSCOPIC, USING DA DAVID X   Location of surgery: [facility] OR  Date and time of surgery: 11/17  Surgeon: Sanjuanita  Pre-Op Appt Date: 10/30  Post-Op Appt Date: 11/27   Packet sent out: Yes  Pre-cert/Authorization completed:  Not Applicable  Date: na

## 2023-09-21 NOTE — LETTER
9/21/2023         RE: You Abbott  [address]  [place] [address]        Dear Colleague,    Thank you for referring your patient, You Abbott, to the [facility]. Please see a copy of my visit note below.    Follow-up for excision of lipoma      Subjective:  Patient feels good.  No complaints or concerns.  Still little sore but overall improving.  He also noted he has an umbilical hernia has been enlarging over the time.  He was good to have it fixed in [place] but opted not to at that time.  Complains of pain at the site.  He does still smoke.  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      Objective:  B/P: 132/82, T: 96.3, P: Data Unavailable, R: Data Unavailable  Chest: Incision healing well.  No evidence of seroma.  Abdomen: Soft, nontender, incarcerated tender umbilical hernia      Pathology: Benign lobulated adipose tissue compatible with benign lipoma.  Negative for malignancy.    Assessment/plan:  This is a 63-year-old gentleman status post excision of a neck/posterior back mass.  Doing well.  Incision healing well.  He also noted to have an umbilical hernia he now wants repaired.  He is incarcerated.  I discussed the options of open versus laparoscopic repair.  He wishes to have a laparoscopic repair.  The plan at this time is to proceed to robotically assisted laparoscopic repair.   The procedure, risks, benefits, and alternatives were discussed and the patient agrees to proceed.  Smoking cessation was strongly encouraged to reduce the risk of hernia recurrence.    Manfred Gann MD, FACS      Again, thank you for allowing me to participate in the care of your patient.        Sincerely,        Malcolm Gann MD

## 2023-09-21 NOTE — PROGRESS NOTES
Follow-up for excision of lipoma      Subjective:  Patient feels good.  No complaints or concerns.  Still little sore but overall improving.  He also noted he has an umbilical hernia has been enlarging over the time.  He was good to have it fixed in [place] but opted not to at that time.  Complains of pain at the site.  He does still smoke.  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      Objective:  B/P: 132/82, T: 96.3, P: Data Unavailable, R: Data Unavailable  Chest: Incision healing well.  No evidence of seroma.  Abdomen: Soft, nontender, incarcerated tender umbilical hernia      Pathology: Benign lobulated adipose tissue compatible with benign lipoma.  Negative for malignancy.    Assessment/plan:  This is a 63-year-old gentleman status post excision of a neck/posterior back mass.  Doing well.  Incision healing well.  He also noted to have an umbilical hernia he now wants repaired.  He is incarcerated.  I discussed the options of open versus laparoscopic repair.  He wishes to have a laparoscopic repair.  The plan at this time is to proceed to robotically assisted laparoscopic repair.   The procedure, risks, benefits, and alternatives were discussed and the patient agrees to proceed.  Smoking cessation was strongly encouraged to reduce the risk of hernia recurrence.    Manfred Gann MD, FACS

## 2023-09-25 DIAGNOSIS — D17.30 LIPOMA OF SKIN AND SUBCUTANEOUS TISSUE: ICD-10-CM

## 2023-09-25 DIAGNOSIS — M54.12 CERVICAL RADICULOPATHY: ICD-10-CM

## 2023-09-25 RX ORDER — OXYCODONE AND ACETAMINOPHEN 5; 325 MG/1; MG/1
1 TABLET ORAL 2 TIMES DAILY PRN
Qty: 60 TABLET | Refills: 0 | Status: SHIPPED | OUTPATIENT
Start: 2023-09-25 | End: 2023-12-26

## 2023-09-25 NOTE — TELEPHONE ENCOUNTER
Medication Question or Refill  oxyCODONE-acetaminophen (PERCOCET) 5-325 MG table     Contacts         Type Contact Phone/Fax    09/25/2023 02:39 PM CDT Phone (Incoming) Liang Abbott (Self) [phone] (M)            What medication are you calling about (include dose and sig)?: oxyCODONE-acetaminophen (PERCOCET) 5-325 MG table     Preferred Pharmacy:   [facility]    Controlled Substance Agreement on file:   CSA -- Patient Level:    CSA: None found at the patient level.       Who prescribed the medication?: Dr. Bradley    Do you need a refill? Yes, No    When did you use the medication last? 9/8/23    Patient offered an appointment? No    Do you have any questions or concerns?  Yes: in a lot of pain      Could we send this information to you in [facility] or would you prefer to receive a phone call?:   Patient would prefer a phone call   Okay to leave a detailed message?: Yes at Cell number on file:    Telephone Information:   Mobile [phone]

## 2023-11-14 ENCOUNTER — TELEPHONE (OUTPATIENT)
Dept: SURGERY | Facility: CLINIC | Age: 63
End: 2023-11-14
Payer: COMMERCIAL

## 2023-11-14 NOTE — TELEPHONE ENCOUNTER
Wife Lisandra called stating Liang is experiencing so much pain in shoulder area from Lipoma removal with  on 9/6.  Having trouble sleeping and moving.  He also cancelled his 11/17 Hernia surgery because of this pain, and other schedule conflict.    Wife wants to know if he should be seen. Please call

## 2023-11-14 NOTE — TELEPHONE ENCOUNTER
Patient states he is having specific pain in the area of the lipoma removal. The lipoma was removed on 9/6. Since his post-op visit on 9/21 the pain has shifted in characteristic. Pain is pin pointed to the lipoma site and worse with activity. Pain is a hot burning muscle feel.     Patient is not able to see incisional site; however, he denies drainage and area being warm to touch. Denies fever, night sweats, chills, and nausea.    Patient scheduled for 11/20 with Dr. Gann for an assessment.     Greer Godfrey RN on 11/14/2023 at 1:46 PM

## 2023-11-14 NOTE — TELEPHONE ENCOUNTER
Spoke to wife Lisandra, she states he needs to cancel 11/17 surgery due to company over and also having arm pain from recent lipoma surgery. (See message to Sanjuanita)   Will call back another day to reschedule.      OR- please move case into depot for future reschedule

## 2023-11-20 ENCOUNTER — OFFICE VISIT (OUTPATIENT)
Dept: SURGERY | Facility: CLINIC | Age: 63
End: 2023-11-20
Payer: COMMERCIAL

## 2023-11-20 VITALS
WEIGHT: 280 LBS | HEIGHT: 70 IN | SYSTOLIC BLOOD PRESSURE: 132 MMHG | BODY MASS INDEX: 40.09 KG/M2 | TEMPERATURE: 98.7 F | DIASTOLIC BLOOD PRESSURE: 90 MMHG

## 2023-11-20 DIAGNOSIS — M54.6 ACUTE RIGHT-SIDED THORACIC BACK PAIN: ICD-10-CM

## 2023-11-20 DIAGNOSIS — Z98.890 POST-OPERATIVE STATE: Primary | ICD-10-CM

## 2023-11-20 PROCEDURE — 99024 POSTOP FOLLOW-UP VISIT: CPT | Performed by: SPECIALIST

## 2023-11-20 RX ORDER — OXYCODONE HYDROCHLORIDE 5 MG/1
5 TABLET ORAL EVERY 6 HOURS PRN
Qty: 10 TABLET | Refills: 0 | Status: SHIPPED | OUTPATIENT
Start: 2023-11-20 | End: 2023-11-23

## 2023-11-20 ASSESSMENT — PAIN SCALES - GENERAL: PAINLEVEL: MILD PAIN (3)

## 2023-11-20 NOTE — PROGRESS NOTES
Follow-up for large lipoma removal in September    Subjective:  Patient is 2 1/2 months status post removal of a large intramuscular lipoma of the right chest/back area.  Reports pinpoint pain in the area of his operative site.  Encouraged with increased activity.  He has known C-spine nerve impingement issues on the left.      Objective:  B/P: 132/90, T: 98.7, P: Data Unavailable, R: Data Unavailable  Chest/right back: Incision healing well.  No seroma.      Assessment/plan:  This is a 63-year-old gentleman with pinpoint sharp pain in the area of a previous removed large lipoma.  This  may represent nerve impingement from scar tissue versus C-spine issues.  Will start physical therapy and get a CAT scan of the area.  No signs of infection at this time.  He will follow-up with me after his course of PT and if that fails a referral to pain management will be given.    Malcolm Gann MD, FACS

## 2023-11-22 ENCOUNTER — HOSPITAL ENCOUNTER (OUTPATIENT)
Dept: CT IMAGING | Facility: CLINIC | Age: 63
Discharge: HOME | End: 2023-11-22
Attending: SPECIALIST | Admitting: SPECIALIST
Payer: COMMERCIAL

## 2023-11-22 DIAGNOSIS — M54.6 ACUTE RIGHT-SIDED THORACIC BACK PAIN: ICD-10-CM

## 2023-11-22 DIAGNOSIS — Z98.890 POST-OPERATIVE STATE: ICD-10-CM

## 2023-11-22 PROCEDURE — 71260 CT THORAX DX C+: CPT

## 2023-11-22 PROCEDURE — 250N000009 HC RX 250: Performed by: SPECIALIST

## 2023-11-22 PROCEDURE — 250N000011 HC RX IP 250 OP 636: Performed by: SPECIALIST

## 2023-11-22 RX ORDER — IOPAMIDOL 755 MG/ML
500 INJECTION, SOLUTION INTRAVASCULAR ONCE
Status: COMPLETED | OUTPATIENT
Start: 2023-11-22 | End: 2023-11-22

## 2023-11-22 RX ADMIN — IOPAMIDOL 81 ML: 755 INJECTION, SOLUTION INTRAVENOUS at 10:22

## 2023-11-22 RX ADMIN — SODIUM CHLORIDE 60 ML: 9 INJECTION, SOLUTION INTRAVENOUS at 10:22

## 2023-12-04 ENCOUNTER — TELEPHONE (OUTPATIENT)
Dept: FAMILY MEDICINE | Facility: CLINIC | Age: 63
End: 2023-12-04

## 2023-12-04 ENCOUNTER — VIRTUAL VISIT (OUTPATIENT)
Dept: FAMILY MEDICINE | Facility: CLINIC | Age: 63
End: 2023-12-04
Payer: COMMERCIAL

## 2023-12-04 DIAGNOSIS — F33.41 RECURRENT MAJOR DEPRESSIVE DISORDER, IN PARTIAL REMISSION (H): ICD-10-CM

## 2023-12-04 DIAGNOSIS — F41.1 GENERALIZED ANXIETY DISORDER: ICD-10-CM

## 2023-12-04 DIAGNOSIS — M54.12 CERVICAL RADICULOPATHY: Primary | ICD-10-CM

## 2023-12-04 DIAGNOSIS — R79.89 LOW TESTOSTERONE IN MALE: ICD-10-CM

## 2023-12-04 PROCEDURE — 99214 OFFICE O/P EST MOD 30 MIN: CPT | Mod: 93 | Performed by: FAMILY MEDICINE

## 2023-12-04 RX ORDER — CYCLOBENZAPRINE HCL 10 MG
10 TABLET ORAL 2 TIMES DAILY PRN
Qty: 60 TABLET | Refills: 1 | Status: SHIPPED | OUTPATIENT
Start: 2023-12-04 | End: 2023-12-26

## 2023-12-04 RX ORDER — GABAPENTIN 100 MG/1
CAPSULE ORAL
Qty: 90 CAPSULE | Refills: 0 | Status: SHIPPED | OUTPATIENT
Start: 2023-12-04 | End: 2023-12-26

## 2023-12-04 RX ORDER — METHYLPREDNISOLONE 4 MG
TABLET, DOSE PACK ORAL
Qty: 21 TABLET | Refills: 0 | Status: SHIPPED | OUTPATIENT
Start: 2023-12-04 | End: 2024-01-30

## 2023-12-04 ASSESSMENT — PATIENT HEALTH QUESTIONNAIRE - PHQ9
10. IF YOU CHECKED OFF ANY PROBLEMS, HOW DIFFICULT HAVE THESE PROBLEMS MADE IT FOR YOU TO DO YOUR WORK, TAKE CARE OF THINGS AT HOME, OR GET ALONG WITH OTHER PEOPLE: SOMEWHAT DIFFICULT
SUM OF ALL RESPONSES TO PHQ QUESTIONS 1-9: 7
SUM OF ALL RESPONSES TO PHQ QUESTIONS 1-9: 7

## 2023-12-04 NOTE — PROGRESS NOTES
"    Instructions Relayed to Patient by Virtual Roomer:     Patient is active on "[employer]:   Relayed following to patient: \"It looks like you are active on "[employer] are you able to join the visit this way? If not, do you need us to send you a link now or would you like your provider to send a link via text or email when they are ready to initiate the visit?\"    Reminded patient to ensure they were logged on to virtual visit by arrival time listed. Documented in appointment notes if patient had flexibility to initiate visit sooner than arrival time. If pediatric virtual visit, ensured pediatric patient along with parent/guardian will be present for video visit.     Patient offered the website [email or web address] and/or phone number to "[employer] Help line: [phone]      Liang is a 63 year old who is being evaluated via a billable telephone visit.      What phone number would you like to be contacted at? [phone]   How would you like to obtain your AVS? 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  Stated he felt the multiple epidural injections.  He was recommended physical therapy but never followed up with that due to work schedule last MRIs was elevated.    The pain became significantly worse.  The same kind of pain in the hands, sharp achy pain that started on her neck, radiating down to his left arm.  Some weakness on the arm as well.  Had the lipoma on the neck removed recently with no complication.    Unable to performed physical exam due to virtual visit.  Cervical spine x-ray couple months ago was pretty normal.  Based on the clinical " presentation, and concern of nerve impingement.  Recommended MRI for further evaluation.  In the meantime, will continue with the gabapentin and Flexeril.  Also recommended to continue his normal activities as tolerated as well over-the-counter medication as needed for pain.  Also treated him with Medrol Dosepak for acute pain exacerbation.  Symptoms that need to be seen or call in discussed.  He felt comfortable with the plan and all of his questions were answered.    Plan: cyclobenzaprine (FLEXERIL) 10 MG tablet,         gabapentin (NEURONTIN) 100 MG capsule, MR         Cervical Spine w/o Contrast, methylPREDNISolone        (MEDROL DOSEPAK) 4 MG tablet therapy pack            (F33.41) Recurrent major depressive disorder, in partial remission (H24)  (F41.1) Generalized anxiety disorder  Comment: He was recently started on the Lexapro and BuSpar which helped with his depression and anxiety.  He was off the medications for several days and he got bad headache which he thought it was caused by the withdraw from the Lexapro and BuSpar.  He stopped taking them since then.  He is not interested to restart the medications.  He feels his depression and anxiety at are tolerable this point.  Symptoms that need to be seen or call in discussed.  Also declined counseling.      (R79.89) Low testosterone in male  Comment: He is obese.  His PSA recently was slightly elevated.  He still would like to try the testosterone supplement.  I discussed with him potential complication associated with testosterone injection and typically we do not recommended with elevated PSA.  He is willing to take the risk.  Will keep an eye on it closely.    Plan: testosterone cypionate (DEPOTESTOSTERONE) 100         MG/ML injection, DISCONTINUED: testosterone         (ANDRODERM) 2 MG/24HR 24 hr patch              21 minutes spent by me on the date of the encounter doing chart review, history and exam, documentation and further activities per the  note       Work on weight loss  Regular exercise    Neel Schmitz Mai, MD  [facility] RIGOBERTO Tavera is a 63 year old, presenting for the following health issues:  Pain        12/4/2023     2:53 PM   Additional Questions   Roomed by maximus       History of Present Illness       Reason for visit:  Talk about pain post surgical lipoma    He eats 2-3 servings of fruits and vegetables daily.He consumes 0 sweetened beverage(s) daily.He exercises with enough effort to increase his heart rate 9 or less minutes per day.  He exercises with enough effort to increase his heart rate 3 or less days per week.   He is taking medications regularly.       Concern - Pain post surgical lipoma  Onset: saw surgeon last week, removed drainage - pain since surgery but more pronounced.   Description: Pt feels pain and burning, gets worse when doing daily activity or walking, movement.   Intensity: 6-8/10  Progression of Symptoms:  worsening  Accompanying Signs & Symptoms: none  Previous history of similar problem: no  Precipitating factors:        Worsened by: when doing things with movement or getting up.  Alleviating factors:        Improved by: Lying in bed  Therapies tried and outcome: lying down to alleviate pain, pt was instructed by surgeon see a physical therapist but would like to further discuss if that would be beneficial.    You was seen today for worsening neck pain. He is known to have severe arthritis in her neck and was recommended cervical spine fusion when her in [place].  He did not feel comfortable with the surgery.  Failed epidural injections.  He was recommended physical therapy but never had a chance to do due to work schedule conflict.  He was on gabapentin which helped.  Been having more pain and unbearable.  Also been having a tingling and burning pain that radiated to the left shoulder and arm. His arm feel weak.  No fever or chill.  Stated the last MRI was over a year ago in [place],  not available to review.    She was recently started on the Lexapro and BuSpar for anxiety.  He was on it for over a month and was in fact feeling better.  He was off the medication for several days and developed severe headache.  He thought it was due to the withdrawal from the medications and therefore had not restarted them since then.  He is not interested  to be back on these medications.  Mental health wise he thinks he is doing okay.  No suicidal or homicidal ideation.    He also is known to have low testosterone.  Has not been able to get testosterone prescription yet due insurance issue.  He would like to try it.  PSA level was slightly high initially but was normal when rechecked it.  No history of benign prostate hypertrophy.  He is morbidly obese.    Review of Systems   Constitutional, HEENT, cardiovascular, pulmonary, gi and gu systems are negative, except as otherwise noted.      Objective           Vitals:  No vitals were obtained today due to virtual visit.    Physical Exam   healthy, alert, and no distress  PSYCH: Alert, coherent speech, normal   rate and volume, able to articulate logical thoughts, able   to abstract reason, no tangential thoughts, no hallucinations   or delusions    RESP: No cough, no audible wheezing, able to talk in full sentences  Remainder of exam unable to be completed due to telephone visits    No results found for any visits on 12/04/23.            Phone call duration: 15 minutes

## 2023-12-05 PROBLEM — M54.6 ACUTE RIGHT-SIDED THORACIC BACK PAIN: Status: RESOLVED | Noted: 2023-11-20 | Resolved: 2023-12-05

## 2023-12-05 PROBLEM — D17.30 LIPOMA OF SKIN AND SUBCUTANEOUS TISSUE: Status: RESOLVED | Noted: 2023-08-07 | Resolved: 2023-12-05

## 2023-12-05 RX ORDER — TESTOSTERONE CYPIONATE 1000 MG/10ML
100 INJECTION, SOLUTION INTRAMUSCULAR
Qty: 10 ML | Refills: 0 | Status: SHIPPED | OUTPATIENT
Start: 2023-12-05 | End: 2024-01-23

## 2023-12-14 ENCOUNTER — HOSPITAL ENCOUNTER (OUTPATIENT)
Dept: MRI IMAGING | Facility: CLINIC | Age: 63
Discharge: HOME | End: 2023-12-14
Attending: FAMILY MEDICINE | Admitting: FAMILY MEDICINE
Payer: COMMERCIAL

## 2023-12-14 DIAGNOSIS — M54.12 CERVICAL RADICULOPATHY: ICD-10-CM

## 2023-12-14 PROCEDURE — 72141 MRI NECK SPINE W/O DYE: CPT

## 2023-12-26 ENCOUNTER — MYC REFILL (OUTPATIENT)
Dept: FAMILY MEDICINE | Facility: CLINIC | Age: 63
End: 2023-12-26
Payer: COMMERCIAL

## 2023-12-26 DIAGNOSIS — D17.30 LIPOMA OF SKIN AND SUBCUTANEOUS TISSUE: ICD-10-CM

## 2023-12-26 DIAGNOSIS — M54.12 CERVICAL RADICULOPATHY: ICD-10-CM

## 2023-12-27 RX ORDER — CYCLOBENZAPRINE HCL 10 MG
10 TABLET ORAL 2 TIMES DAILY PRN
Qty: 60 TABLET | Refills: 1 | Status: SHIPPED | OUTPATIENT
Start: 2023-12-27 | End: 2024-01-23

## 2023-12-27 RX ORDER — GABAPENTIN 100 MG/1
CAPSULE ORAL
Qty: 90 CAPSULE | Refills: 0 | Status: SHIPPED | OUTPATIENT
Start: 2023-12-27 | End: 2024-01-23

## 2023-12-27 RX ORDER — OXYCODONE AND ACETAMINOPHEN 5; 325 MG/1; MG/1
1 TABLET ORAL 2 TIMES DAILY PRN
Qty: 60 TABLET | Refills: 0 | Status: SHIPPED | OUTPATIENT
Start: 2023-12-27 | End: 2024-01-30

## 2024-01-23 ENCOUNTER — MYC REFILL (OUTPATIENT)
Dept: FAMILY MEDICINE | Facility: CLINIC | Age: 64
End: 2024-01-23
Payer: COMMERCIAL

## 2024-01-23 DIAGNOSIS — D17.30 LIPOMA OF SKIN AND SUBCUTANEOUS TISSUE: ICD-10-CM

## 2024-01-23 DIAGNOSIS — R79.89 LOW TESTOSTERONE IN MALE: ICD-10-CM

## 2024-01-23 DIAGNOSIS — M54.12 CERVICAL RADICULOPATHY: ICD-10-CM

## 2024-01-24 RX ORDER — OXYCODONE AND ACETAMINOPHEN 5; 325 MG/1; MG/1
1 TABLET ORAL 2 TIMES DAILY PRN
Qty: 60 TABLET | Refills: 0 | OUTPATIENT
Start: 2024-01-24

## 2024-01-24 RX ORDER — GABAPENTIN 300 MG/1
300 CAPSULE ORAL 3 TIMES DAILY PRN
Qty: 180 CAPSULE | Refills: 1 | Status: SHIPPED | OUTPATIENT
Start: 2024-01-24

## 2024-01-24 RX ORDER — CYCLOBENZAPRINE HCL 10 MG
10 TABLET ORAL 2 TIMES DAILY PRN
Qty: 180 TABLET | Refills: 1 | Status: SHIPPED | OUTPATIENT
Start: 2024-01-24

## 2024-01-24 RX ORDER — TESTOSTERONE CYPIONATE 1000 MG/10ML
100 INJECTION, SOLUTION INTRAMUSCULAR
Qty: 10 ML | Refills: 0 | Status: SHIPPED | OUTPATIENT
Start: 2024-01-24

## 2024-01-30 ENCOUNTER — VIRTUAL VISIT (OUTPATIENT)
Dept: FAMILY MEDICINE | Facility: CLINIC | Age: 64
End: 2024-01-30
Payer: COMMERCIAL

## 2024-01-30 DIAGNOSIS — M54.12 CERVICAL RADICULOPATHY: Primary | ICD-10-CM

## 2024-01-30 DIAGNOSIS — F33.41 RECURRENT MAJOR DEPRESSIVE DISORDER, IN PARTIAL REMISSION (H): ICD-10-CM

## 2024-01-30 DIAGNOSIS — E66.01 MORBID OBESITY (H): ICD-10-CM

## 2024-01-30 DIAGNOSIS — F41.1 GENERALIZED ANXIETY DISORDER: ICD-10-CM

## 2024-01-30 PROCEDURE — 99442 PR PHYSICIAN TELEPHONE EVALUATION 11-20 MIN: CPT | Mod: 93 | Performed by: FAMILY MEDICINE

## 2024-01-30 RX ORDER — OXYCODONE AND ACETAMINOPHEN 5; 325 MG/1; MG/1
1 TABLET ORAL 2 TIMES DAILY PRN
Qty: 60 TABLET | Refills: 0 | Status: SHIPPED | OUTPATIENT
Start: 2024-01-30

## 2024-01-30 RX ORDER — CITALOPRAM HYDROBROMIDE 20 MG/1
20 TABLET ORAL DAILY
Qty: 90 TABLET | Refills: 1 | Status: SHIPPED | OUTPATIENT
Start: 2024-01-30

## 2024-01-30 RX ORDER — MELOXICAM 15 MG/1
15 TABLET ORAL DAILY
Qty: 90 TABLET | Refills: 0 | Status: SHIPPED | OUTPATIENT
Start: 2024-01-30

## 2024-01-30 ASSESSMENT — PATIENT HEALTH QUESTIONNAIRE - PHQ9
SUM OF ALL RESPONSES TO PHQ QUESTIONS 1-9: 1
SUM OF ALL RESPONSES TO PHQ QUESTIONS 1-9: 1
10. IF YOU CHECKED OFF ANY PROBLEMS, HOW DIFFICULT HAVE THESE PROBLEMS MADE IT FOR YOU TO DO YOUR WORK, TAKE CARE OF THINGS AT HOME, OR GET ALONG WITH OTHER PEOPLE: SOMEWHAT DIFFICULT

## 2024-01-30 NOTE — PROGRESS NOTES
"    Instructions Relayed to Patient by Virtual Roomer:     Patient is active on [employer]:   Relayed following to patient: \"It looks like you are active on [employer], are you able to join the visit this way? If not, do you need us to send you a link now or would you like your provider to send a link via text or email when they are ready to initiate the visit?\"    Reminded patient to ensure they were logged on to virtual visit by arrival time listed. Documented in appointment notes if patient had flexibility to initiate visit sooner than arrival time. If pediatric virtual visit, ensured pediatric patient along with parent/guardian will be present for video visit.     Patient offered the website [email or web address] and/or phone number to [employer] Help line: [phone]      Liang is a 63 year old who is being evaluated via a billable telephone visit.      What phone number would you like to be contacted at? [phone]   How would you like to obtain your AVS? [facility]    Distant Location (provider location):  On-site    Assessment & Plan     (M54.12) Cervical radiculopathy  (primary encounter diagnosis)  Comment: Is known to have degenerative joint disease of the cervical spine.  The pain has gotten significant worse since his lipoma removed.  The pain keeps him up at night and is affecting his daily activities.  Recent MRI showed DJD with mild spinal stenosis at C5-C6 level.  His health insurance is running out in a couple days, will not be able to get his health insurance again for at least couple months.  The last prescription for pain med was more than 2 months ago with 14 tablets.  He was given 60 tablets of Percocet to be taken as needed only for severe pain.  Also started him on Mobic for pain.  Normal activity as tolerated.  He was instructed to take the Mobic with food.  No history of GI bleeding.  Follow-up when his health insurance is reactivated for further evaluation and management " "options.      Plan: meloxicam (MOBIC) 15 MG tablet,         oxyCODONE-acetaminophen (PERCOCET) 5-325 MG         tablet            (F41.1) Generalized anxiety disorder  (F33.41) Recurrent major depressive disorder, in partial remission (H24)  Comment: He is known to have depression and anxiety.  Was on Celexa and BuSpar which helped.  Stopped taking it for couple months.  Anxiety and depression are getting worse.  Restarted him on the Celexa today.  Potential side effect discussed.  Symptoms that need to be since or call in discussed.  Declined counseling.  He was instructed to go to the ER or call 911 if develops suicidal thoughts, plan or intention.  All of his questions were answered.    Plan: citalopram (CELEXA) 20 MG tablet            (E66.01) Morbid obesity (H)  Comment: Exercising and healthy diet discussed and encouraged.  Recommended follow-up when his insurance is reactivated, will consider pharmacological treatment and/or referral to weight management.  His BMI today is 41.      25 minutes spent by me on the date of the encounter doing chart review, history and exam, documentation and further activities per the note      BMI  Estimated body mass index is 41.3 kg/m  as calculated from the following:    Height as of 1/31/24: 1.778 m (5' 10\").    Weight as of 1/31/24: 130.5 kg (287 lb 12.8 oz).   Weight management plan: Discussed healthy diet and exercise guidelines      Work on weight loss  Regular exercise    Alma Tavera is a 63 year old, presenting for the following health issues:  Recheck Medication        1/31/2024    10:47 AM   Additional Questions   Roomed by Emma DALTON     History of Present Illness       Reason for visit:  Pain medication follow up    He eats 0-1 servings of fruits and vegetables daily.He consumes 0 sweetened beverage(s) daily.He exercises with enough effort to increase his heart rate 9 or less minutes per day.  He exercises with enough effort to increase his heart rate 3 or " less days per week. He is missing 1 dose(s) of medications per week.  He is not taking prescribed medications regularly due to remembering to take.       Chronic/Recurring Back Pain Follow Up    Where is your back pain located? (Select all that apply) neck right and shoulders right  How would you describe your back pain?  burning, cramping, sharp, and shooting  Where does your back pain spread? nowhere  Since your last clinic visit for back pain, how has your pain changed? always present, but gets better and worse  Does your back pain interfere with your job? Not applicable  Since your last visit, have you tried any new treatment? No  How many servings of fruits and vegetables do you eat daily?  0-1  On average, how many sweetened beverages do you drink each day (Examples: soda, juice, sweet tea, etc.  Do NOT count diet or artificially sweetened beverages)?   0  How many days per week do you exercise enough to make your heart beat faster? 3 or less  How many minutes a day do you exercise enough to make your heart beat faster? 9 or less  How many days per week do you miss taking your medication? 1  What makes it hard for you to take your medications?  remembering to take    Liang was seen today for follow-up on her neck pain and depression/anxiety.  He is known to have degenerative joint disease of the cervical spine.  The pain has gotten significant worse since his lipoma removed.  The pain keeps him up at night and is affecting his daily activities.  Recent MRI showed DJD with mild spinal stenosis at C5-C6 level.  His health insurance is running out in a couple days, will not be able to get his health insurance again for at least couple months.  The last prescription for pain med was more than 2 months ago with 14 tablets.  He requested pain medication refill to last him until he is able to get his health insurance.  He takes it only as needed.  Is a dull constant achy pain that is localized on his neck; no  radiation to the arm.  The pain is more intense on the upper right shoulder where the lipoma removed about a month ago.  The pain keeps him up at night.    He also has depression and anxiety for years.  Was on Celexa and BuSpar which helped.  Stopped taking it for couple months.  Anxiety and depression are getting worse.  No suicidal homicidal ideation.  No hallucination.  No drugs or alcohol.  Been having more stress.  Sleeping okay.      Review of Systems  Constitutional, HEENT, cardiovascular, pulmonary, gi and gu systems are negative, except as otherwise noted.      Objective           Vitals:  No vitals were obtained today due to virtual visit.    Physical Exam   General: Alert and no distress //Respiratory: No audible wheeze, cough, or shortness of breath // Psychiatric:  Appropriate affect, tone, and pace of words      No results found for any visits on 01/30/24.      Phone call duration: 17 minutes  Signed Electronically by: Neel Schmitz Mai, MD

## 2024-06-07 ENCOUNTER — TELEPHONE (OUTPATIENT)
Dept: FAMILY MEDICINE | Facility: CLINIC | Age: 64
End: 2024-06-07
Payer: COMMERCIAL

## 2024-06-07 NOTE — TELEPHONE ENCOUNTER
Forms/Letter Request    Type of form/letter: OTHER: Certified Medical Record with a provider's signature on it. He needs it for proof to get his 's license       Do we have the form/letter: Not sure?     When is form/letter needed by: As soon as able    How would you like the form/letter returned:     Patient Notified form requests are processed in 5-7 business days:Yes    Could we send this information to you in [place] or would you prefer to receive a phone call?:   Patient would prefer a phone call   Okay to leave a detailed message?: Yes at Cell number on file:    Telephone Information:   Mobile [phone]     RITA Tobias, RN

## 2024-10-06 ENCOUNTER — HEALTH MAINTENANCE LETTER (OUTPATIENT)
Age: 64
End: 2024-10-06

## 2025-04-02 NOTE — LETTER
11/20/2023         RE: You Abbott  [address] [place] [address] [address]  [facility] [address]        Dear Colleague,    Thank you for referring your patient, You Abbott, to the [facility]. Please see a copy of my visit note below.    Follow-up for large lipoma removal in September    Subjective:  Patient is 2 1/2 months status post removal of a large intramuscular lipoma of the right chest/back area.  Reports pinpoint pain in the area of his operative site.  Encouraged with increased activity.  He has known C-spine nerve impingement issues on the left.      Objective:  B/P: 132/90, T: 98.7, P: Data Unavailable, R: Data Unavailable  Chest/right back: Incision healing well.  No seroma.      Assessment/plan:  This is a 63-year-old gentleman with pinpoint sharp pain in the area of a previous removed large lipoma.  This  may represent nerve impingement from scar tissue versus C-spine issues.  Will start physical therapy and get a CAT scan of the area.  No signs of infection at this time.  He will follow-up with me after his course of PT and if that fails a referral to pain management will be given.    Malcolm Gann MD, FACS      Again, thank you for allowing me to participate in the care of your patient.        Sincerely,        Malcolm Gann MD   Assessment/Plan:  Ashlee [facility] is [place] 30 y.o. female who presents to clinic today to address the following issues:   1. Well woman exam        2. Healthcare maintenance  Referral to Gynecology      3. PCOS (polycystic ovarian syndrome)  spironolactone (Aldactone) 50 mg tablet      4. Hirsutism  spironolactone (Aldactone) 50 mg tablet        Pap smear: done today, if wnl repeat 5 years  Mammogram: not yet indicated  HIV Screen: ordered  Hep C Screen: ordered  DV: neg 4/2025  DEXA: NYI    30-year-old female with PCOS who is experienced hirsutism and potentially interested in fertility in the future.  Not currently trying to become pregnant.  Will treat with spironolactone 50 mg and recheck in 4 to 6 weeks to see if symptoms are improving.  Additionally recommended cosmetic treatment of hirsutism.    Problem List Items Addressed This Visit       PCOS (polycystic ovarian syndrome)    Relevant Medications    spironolactone (Aldactone) 50 mg tablet    Hirsutism    Relevant Medications    spironolactone (Aldactone) 50 mg tablet     Other Visit Diagnoses       Well woman exam    -  Primary    Healthcare maintenance                Patient Instructions   Polycystic Ovarian Syndrome (PCOS):  5-20% of people with ovaries have this  Need 2 out of 3 to be diagnosed:  1. Irregular Periods  2. High Androgens (extra hair growth, acne, high testosterone)  3. Cysts on ovaries (found on ultrasound)    We do not fully understand why this develops in people. It does cause hyperandrogenism (high androgens) and insulin resistance.    Management through lifestyle changes & medication:  1. Balance your blood sugar by pairing them with protein, fat and fiber at meals and snack time, this helps to prevent blood sugar spikes and crashes  2. Birth control to help the uterus from lack of ovulation (when you don't ovulate your uterine lining can get thick and increase your risk of endometrial cancer, up to 5x)  3. Metformin can help with insulin  resistance, this can potentially help with fertility and weight loss  4. Inositol supplements can be helpful as an alternative, you want a 40:1 ratio of myoinositol to d-chiro-inositol    PCOS is the #1 cause of infertility but it does not mean that you are infertile. If you are having trouble with getting pregnant please see your women's health provider (OB/GYN or Family medicine doctor). Weight loss is going to be the best thing to help increase your odds of regularly ovulating and getting pregnant. There are other options though.    Follow up: 1 month    Return precautions discussed.  An After Visit Summary was given to the patient.  All questions were answered and patient in agreement with plan.    Subjective:  [place] [facility] [place] 30 y.o. [place] who presents to clinic today for New Patient Visit      HPI:    Gynecologic History:  (Please complete obstetric history in the history tab)  - Do you have any menstrual concerns? Yes, some irregularity, worse when under stress, vaginal itching  - Do you have any breast concerns? no    - Date of last pap smear: >3 years ago  - Results of last pap smear, if known: normal  - Personal history of prior abnormal pap smear?: no    - Date of last mammogram: never    Sexual history (provider to ask):  - Have you been sexually active within the past year? yes  - What are the genders of your sexual partner(s)? male  - Are you or your spouse/partner wanting to become pregnant or have children in the next year?  Open to it  - If no, are you currently using any method to prevent pregnancy:? none  - Do you have any questions or concerns about contraceptive access? no     - Do you have a personal history of sexually transmitted infections (STI)?: no  - Have you ever been tested for HIV? no  - Do you want comprehensive STI testing today? yes    Domestic Violence Screening (Provider to ask):  Because violence is so common in many people's lives and because there is help available for  those being abused, I now ask every patient about domestic violence:  - Within the past year have you been hit, slapped, kicked or otherwise physically hurt by someone? no  - Are you in a relationship with a person who threatens or physically hurts you? no  - Has anyone forced you to have sexual activities that made you feel uncomfortable? no      Review of Systems    Objective:  /80   Ht 1.524 m (5')   Wt 105 kg (230 lb 9.6 oz)   LMP 03/07/2025 (Exact Date)   BMI 45.04 kg/m²     Physical Exam  Vitals and nursing note reviewed. Exam conducted with a chaperone present (Roya).   Constitutional:       General: She is not in acute distress.     Appearance: Normal appearance. She is obese.   HENT:      Head: Normocephalic and atraumatic.      Mouth/Throat:      Mouth: Mucous membranes are moist.   Eyes:      General: No scleral icterus.        Right eye: No discharge.         Left eye: No discharge.      Extraocular Movements: Extraocular movements intact.      Conjunctiva/sclera: Conjunctivae normal.   Pulmonary:      Effort: Pulmonary effort is normal. No respiratory distress.   Chest:      Chest wall: No mass, lacerations or deformity.   Breasts:     Rudi Score is 5.      Breasts are symmetrical.      Right: Normal.      Left: Normal.   Abdominal:      General: Abdomen is flat. There is no distension.      Palpations: Abdomen is soft.   Genitourinary:     General: Normal vulva.      Exam position: Supine.      Pubic Area: No rash or pubic lice.       Rudi stage (genital): 5.      Vagina: Normal.      Cervix: Normal.      Uterus: Normal.       Adnexa: Right adnexa normal and left adnexa normal.   Lymphadenopathy:      Upper Body:      Right upper body: No supraclavicular, axillary or pectoral adenopathy.      Left upper body: No supraclavicular, axillary or pectoral adenopathy.   Skin:     General: Skin is warm and dry.   Neurological:      General: No focal deficit present.      Mental Status: She is  alert and oriented to person, place, and time.   Psychiatric:         Attention and Perception: Attention normal.         Mood and Affect: Mood normal.         Speech: Speech normal.         Behavior: Behavior normal.         Cognition and Memory: Cognition and memory normal.         Judgment: Judgment normal.         I spent 18 minutes in total time for this visit including all related clinical activities before, during, and after the visit excluding other billable activities/procedure time.     Ebony Rockwell MD

## 2025-04-28 SDOH — HEALTH STABILITY: PHYSICAL HEALTH: ON AVERAGE, HOW MANY MINUTES DO YOU ENGAGE IN EXERCISE AT THIS LEVEL?: 0 MIN

## 2025-04-28 SDOH — HEALTH STABILITY: PHYSICAL HEALTH: ON AVERAGE, HOW MANY DAYS PER WEEK DO YOU ENGAGE IN MODERATE TO STRENUOUS EXERCISE (LIKE A BRISK WALK)?: 0 DAYS

## 2025-04-28 ASSESSMENT — PATIENT HEALTH QUESTIONNAIRE - PHQ9
10. IF YOU CHECKED OFF ANY PROBLEMS, HOW DIFFICULT HAVE THESE PROBLEMS MADE IT FOR YOU TO DO YOUR WORK, TAKE CARE OF THINGS AT HOME, OR GET ALONG WITH OTHER PEOPLE: VERY DIFFICULT
SUM OF ALL RESPONSES TO PHQ QUESTIONS 1-9: 17
SUM OF ALL RESPONSES TO PHQ QUESTIONS 1-9: 17

## 2025-04-28 ASSESSMENT — SOCIAL DETERMINANTS OF HEALTH (SDOH): HOW OFTEN DO YOU GET TOGETHER WITH FRIENDS OR RELATIVES?: NEVER

## 2025-04-29 ENCOUNTER — OFFICE VISIT (OUTPATIENT)
Dept: FAMILY MEDICINE | Facility: CLINIC | Age: 65
End: 2025-04-29
Payer: MEDICARE

## 2025-04-29 VITALS
HEIGHT: 72 IN | OXYGEN SATURATION: 98 % | WEIGHT: 262 LBS | HEART RATE: 82 BPM | DIASTOLIC BLOOD PRESSURE: 84 MMHG | SYSTOLIC BLOOD PRESSURE: 134 MMHG | RESPIRATION RATE: 20 BRPM | BODY MASS INDEX: 35.49 KG/M2 | TEMPERATURE: 97.8 F

## 2025-04-29 DIAGNOSIS — Z72.0 TOBACCO ABUSE DISORDER: ICD-10-CM

## 2025-04-29 DIAGNOSIS — M54.12 CERVICAL RADICULOPATHY: ICD-10-CM

## 2025-04-29 DIAGNOSIS — E66.01 MORBID OBESITY (H): ICD-10-CM

## 2025-04-29 DIAGNOSIS — Z00.00 ENCOUNTER FOR MEDICARE ANNUAL WELLNESS EXAM: Primary | ICD-10-CM

## 2025-04-29 DIAGNOSIS — R97.20 ELEVATED PROSTATE SPECIFIC ANTIGEN (PSA): ICD-10-CM

## 2025-04-29 DIAGNOSIS — Z00.00 ENCOUNTER FOR MEDICARE ANNUAL WELLNESS EXAM: ICD-10-CM

## 2025-04-29 DIAGNOSIS — Z12.11 SCREEN FOR COLON CANCER: Primary | ICD-10-CM

## 2025-04-29 DIAGNOSIS — Z12.5 SCREENING FOR PROSTATE CANCER: ICD-10-CM

## 2025-04-29 DIAGNOSIS — Z12.11 SCREEN FOR COLON CANCER: ICD-10-CM

## 2025-04-29 DIAGNOSIS — F33.1 MODERATE EPISODE OF RECURRENT MAJOR DEPRESSIVE DISORDER (H): ICD-10-CM

## 2025-04-29 DIAGNOSIS — G47.33 OBSTRUCTIVE SLEEP APNEA SYNDROME: ICD-10-CM

## 2025-04-29 DIAGNOSIS — R79.89 LOW TESTOSTERONE IN MALE: ICD-10-CM

## 2025-04-29 DIAGNOSIS — Z87.898 HISTORY OF PREDIABETES: ICD-10-CM

## 2025-04-29 DIAGNOSIS — Z87.891 PERSONAL HISTORY OF TOBACCO USE: ICD-10-CM

## 2025-04-29 DIAGNOSIS — F41.1 GENERALIZED ANXIETY DISORDER: ICD-10-CM

## 2025-04-29 LAB
ALBUMIN SERPL BCG-MCNC: 4.5 G/DL (ref 3.5–5.2)
ALP SERPL-CCNC: 103 U/L (ref 40–150)
ALT SERPL W P-5'-P-CCNC: 14 U/L (ref 0–70)
ANION GAP SERPL CALCULATED.3IONS-SCNC: 11 MMOL/L (ref 7–15)
AST SERPL W P-5'-P-CCNC: 14 U/L (ref 0–45)
BILIRUB SERPL-MCNC: 0.4 MG/DL
BUN SERPL-MCNC: 13.1 MG/DL (ref 8–23)
CALCIUM SERPL-MCNC: 9.6 MG/DL (ref 8.8–10.4)
CHLORIDE SERPL-SCNC: 105 MMOL/L (ref 98–107)
CREAT SERPL-MCNC: 0.77 MG/DL (ref 0.67–1.17)
EGFRCR SERPLBLD CKD-EPI 2021: >90 ML/MIN/1.73M2
GLUCOSE SERPL-MCNC: 105 MG/DL (ref 70–99)
HCO3 SERPL-SCNC: 25 MMOL/L (ref 22–29)
POTASSIUM SERPL-SCNC: 3.8 MMOL/L (ref 3.4–5.3)
PROT SERPL-MCNC: 7.6 G/DL (ref 6.4–8.3)
PSA SERPL DL<=0.01 NG/ML-MCNC: 7.56 NG/ML (ref 0–4.5)
SHBG SERPL-SCNC: 37 NMOL/L (ref 11–80)
SODIUM SERPL-SCNC: 141 MMOL/L (ref 135–145)
TSH SERPL DL<=0.005 MIU/L-ACNC: 1.28 UIU/ML (ref 0.3–4.2)

## 2025-04-29 PROCEDURE — 80053 COMPREHEN METABOLIC PANEL: CPT | Performed by: FAMILY MEDICINE

## 2025-04-29 PROCEDURE — 84403 ASSAY OF TOTAL TESTOSTERONE: CPT | Performed by: FAMILY MEDICINE

## 2025-04-29 PROCEDURE — G0296 VISIT TO DETERM LDCT ELIG: HCPCS | Performed by: FAMILY MEDICINE

## 2025-04-29 PROCEDURE — G0103 PSA SCREENING: HCPCS | Performed by: FAMILY MEDICINE

## 2025-04-29 PROCEDURE — 1125F AMNT PAIN NOTED PAIN PRSNT: CPT | Performed by: FAMILY MEDICINE

## 2025-04-29 PROCEDURE — 3075F SYST BP GE 130 - 139MM HG: CPT | Performed by: FAMILY MEDICINE

## 2025-04-29 PROCEDURE — G0402 INITIAL PREVENTIVE EXAM: HCPCS | Performed by: FAMILY MEDICINE

## 2025-04-29 PROCEDURE — 36415 COLL VENOUS BLD VENIPUNCTURE: CPT | Performed by: FAMILY MEDICINE

## 2025-04-29 PROCEDURE — 3079F DIAST BP 80-89 MM HG: CPT | Performed by: FAMILY MEDICINE

## 2025-04-29 PROCEDURE — 84270 ASSAY OF SEX HORMONE GLOBUL: CPT | Performed by: FAMILY MEDICINE

## 2025-04-29 PROCEDURE — 99214 OFFICE O/P EST MOD 30 MIN: CPT | Mod: 25 | Performed by: FAMILY MEDICINE

## 2025-04-29 PROCEDURE — 84443 ASSAY THYROID STIM HORMONE: CPT | Mod: GZ | Performed by: FAMILY MEDICINE

## 2025-04-29 PROCEDURE — G2211 COMPLEX E/M VISIT ADD ON: HCPCS | Performed by: FAMILY MEDICINE

## 2025-04-29 RX ORDER — OXYCODONE HYDROCHLORIDE 5 MG/1
5 TABLET ORAL EVERY 6 HOURS PRN
Qty: 12 TABLET | Refills: 0 | Status: SHIPPED | OUTPATIENT
Start: 2025-04-29

## 2025-04-29 ASSESSMENT — PAIN SCALES - GENERAL: PAINLEVEL_OUTOF10: MODERATE PAIN (5)

## 2025-04-29 NOTE — PROGRESS NOTES
Preventive Care Visit  Formerly [facility]  Neel Schmitz Mai, MD, Family Medicine  Apr 29, 2025  {Provider  Link to SmartSet :293257}    {PROVIDER CHARTING PREFERENCE:628820}    Alma Tavera is a 64 year old, presenting for the following:  Wellness Visit        4/29/2025     1:22 PM   Additional Questions   Roomed by Alexia HOWARD   Accompanied by Chen grand daughter          HPI  ***     {SUPERLIST (Optional):743932}  {additonal problems for provider to add (Optional):086236}  Advance Care Planning  {The storyboard will display whether the patient has ACP docs on file. Hover over the Code section in the storyboard to access the ACP documents. :513125}  Discussed advance care planning with patient; however, patient declined at this time.        4/28/2025   General Health   How would you rate your overall physical health? (!) POOR   Feel stress (tense, anxious, or unable to sleep) To some extent   (!) STRESS CONCERN      4/28/2025   Nutrition   Diet: Regular (no restrictions)         4/28/2025   Exercise   Days per week of moderate/strenous exercise 0 days   Average minutes spent exercising at this level 0 min   (!) EXERCISE CONCERN      4/28/2025   Social Factors   Frequency of gathering with friends or relatives Never   Worry food won't last until get money to buy more No   Food not last or not have enough money for food? No   Do you have housing? (Housing is defined as stable permanent housing and does not include staying outside in a car, in a tent, in an abandoned building, in an overnight shelter, or couch-surfing.) Yes   Are you worried about losing your housing? No   Lack of transportation? No   Unable to get utilities (heat,electricity)? No   (!) SOCIAL CONNECTIONS CONCERN      4/28/2025   Fall Risk   Fallen 2 or more times in the past year? No   Trouble with walking or balance? No          4/28/2025   Activities of Daily Living- Home Safety   Needs help with the following daily activites  None of the above   Safety concerns in the home No grab bars in the bathroom         4/28/2025   Dental   Dentist two times every year? (!) NO         4/28/2025   Hearing Screening   Hearing concerns? (!) TROUBLE UNDERSTANDING SOFT OR WHISPERED SPEECH.    None of the above       Multiple values from one day are sorted in reverse-chronological order         4/28/2025   Driving Risk Screening   Patient/family members have concerns about driving No         4/28/2025   General Alertness/Fatigue Screening   Have you been more tired than usual lately? (!) YES         4/28/2025   Urinary Incontinence Screening   Bothered by leaking urine in past 6 months No       Today's PHQ-9 Score:       4/28/2025     2:19 PM   PHQ-9 SCORE   PHQ-9 Total Score MyChart 17 (Moderately severe depression)   PHQ-9 Total Score 17        Patient-reported         4/28/2025   Substance Use   Alcohol more than 3/day or more than 7/wk Yes   How often do you have a drink containing alcohol 2 to 3 times a week   How many alcohol drinks on typical day 7 to 9   How often do you have 5+ drinks at one occasion Weekly   Audit 2/3 Score 6   How often not able to stop drinking once started Daily or almost daily   How often failed to do what normally expected Weekly   How often needed first drink in am after a heavy drinking session Never   How often feeling of guilt or remorse after drinking Never   How often unable to remember what happened the night before Never   Have you or someone else been injured because of your drinking No   Has anyone been concerned or suggested you cut down on drinking Yes, during the last year   TOTAL SCORE - AUDIT 20   Do you have a current opioid prescription? No   How severe/bad is pain from 1 to 10? 7/10   Do you use any other substances recreationally? No     Social History     Tobacco Use    Smoking status: Every Day     Current packs/day: 1.00     Types: Cigarettes    Smokeless tobacco: Current    Tobacco comments:     Used  to smoke up to 2 ppd. Smoking since 20 yo   Vaping Use    Vaping status: Never Used   Substance Use Topics    Alcohol use: Yes     Comment: couple a week - 3-4 beers each time    Drug use: Never     {Provider  If there are gaps in the social history shown above, please follow the link to update and then refresh the note Link to Social and Substance History :280371}      4/28/2025   AAA Screening   Family history of Abdominal Aortic Aneurysm (AAA)? Unsure   Last PSA:   Prostate Specific Antigen Screen   Date Value Ref Range Status   09/08/2023 4.38 0.00 - 4.50 ng/mL Final     ASCVD Risk   The 10-year ASCVD risk score (Jemma ESQUIVEL, et al., 2019) is: 18.4%    Values used to calculate the score:      Age: 64 years      Sex: Male      Is Non- : No      Diabetic: No      Tobacco smoker: Yes      Systolic Blood Pressure: 134 mmHg      Is BP treated: No      HDL Cholesterol: 46 mg/dL      Total Cholesterol: 181 mg/dL    {Link to Fracture Risk Assessment Tool (Optional):768297}    {Provider  REQUIRED FOR AWV Use the storyboard to review patient history, after sections have been marked as reviewed, refresh note to capture documentation:914896}  {Provider   REQUIRED AWV use this link to review and update sexual activity history  after section has been marked as reviewed, refresh note to capture documentation:906118}  Reviewed and updated as needed this visit by Provider                    {HISTORY OPTIONS (Optional):644982}  Current providers sharing in care for this patient include:  Patient Care Team:  Neel Bradley MD as PCP - General (Family Medicine)  Neel Bradley MD as Assigned PCP  Slava Scott MD as MD (Urology)  Farzana Blunt PA-C as Physician Assistant (Dermatology)  Malcolm Gann MD as Assigned Heart and Vascular Surgical Provider    The following health maintenance items are reviewed in Epic and correct as of today:  Health Maintenance   Topic Date Due     "COLORECTAL CANCER SCREENING  Never done    ZOSTER IMMUNIZATION (2 of 2) 10/02/2023    MEDICARE ANNUAL WELLNESS VISIT  08/07/2024    ANNUAL REVIEW OF HM ORDERS  08/07/2024    INFLUENZA VACCINE (1) 09/01/2024    COVID-19 Vaccine (1 - 2024-25 season) Never done    LUNG CANCER SCREENING  11/22/2024    PHQ-9  10/29/2025    FALL RISK ASSESSMENT  04/29/2026    DIABETES SCREENING  08/07/2026    LIPID  08/07/2028    ADVANCE CARE PLANNING  08/12/2028    DTAP/TDAP/TD IMMUNIZATION (2 - Td or Tdap) 08/07/2033    RSV VACCINE (1 - 1-dose 75+ series) 05/20/2034    HEPATITIS C SCREENING  Completed    HIV SCREENING  Completed    DEPRESSION ACTION PLAN  Completed    Pneumococcal Vaccine: 50+ Years  Completed    HPV IMMUNIZATION  Aged Out    MENINGITIS IMMUNIZATION  Aged Out       {ROS Picklists (Optional):078216}     Objective    Exam  /84   Pulse 82   Temp 97.8  F (36.6  C) (Temporal)   Resp 20   Ht 1.82 m (5' 11.65\")   Wt 118.8 kg (262 lb)   SpO2 98%   BMI 35.88 kg/m     Estimated body mass index is 35.88 kg/m  as calculated from the following:    Height as of this encounter: 1.82 m (5' 11.65\").    Weight as of this encounter: 118.8 kg (262 lb).    Physical Exam  {Exam Choices (Optional):578662}        4/29/2025   Mini Cog   Clock Draw Score 2 Normal   3 Item Recall 3 objects recalled   Mini Cog Total Score 5     {A Mini-Cog total score of 0-2 suggests the possibility of dementia, score of 3-5 suggests no dementia:655850}    Vision Screen     {Provider  Link to Vision and Hearing Results :066942}    Signed Electronically by: Neel Schmitz Mai, MD  {Email feedback regarding this note to [email or web address]   :909093}  Answers submitted by the patient for this visit:  Patient Health Questionnaire (Submitted on 4/28/2025)  If you checked off any problems, how difficult have these problems made it for you to do your work, take care of things at home, or get along with other people?: Very " difficult  PHQ9 TOTAL SCORE: 17     Flexeril, Celexa and Mobic.  He stopped all the medications by choice for about a year.  Overall he is feeling about the same.  He has no concern over his mental health.  He has learned to live with the depression and anxiety.  No suicidal homicidal ideation.  No hallucination.  Been having more stress.  Not exercising but been trying to eat healthier food.  Lost about 25 pounds in the last year and is very happy with it.  Stopped drinking for three weeks and doing well; no withdrawal.  He is known to have sleep apnea but never uses CPAP due to is intolerance.  Continue to have problems with excessive daytime fatigue and sleepiness.  His family is being evicted and had to move out of the house within the next couple days. He is known to have chronic neck pain due to arthritis which has been tolerable with Tylenol Motrin.  He is concerned of increased the pain with the moving.    Stated that his ED and low libido are overall about the same, been having them for years.  He is careless about intimacy and in fact he has not been sexually active for at least couple years.  It however has become a problem for his spouse.  Viagra prescribed last year but never picked up.       Continues to smoke about 1 ppd, not ready to quit.  Been a smoker for about 50 years, average about 0.75 ppd.  Not ready to quit.       No major medical care or procedure done since the last physical a year.  Ago.  No headache, dizziness or acute visual change. No runny nose, nasal congestion, coughing, fever or chill. No CP/SOB. No N/V/D/C or problem with urination - no nocturia.  No abdominal pain.  No leg swelling, orthopnea or dyspnea.  Not exercising. Social drinker, stopped drinking completely for 3 weeks.  No drug use.  No safety concerns, lives with his spouse and granddaughter.  No other concern today     Advance Care Planning    Discussed advance care planning with patient; however, patient declined at this time.        4/28/2025   General  Health   How would you rate your overall physical health? (!) POOR   Feel stress (tense, anxious, or unable to sleep) To some extent   (!) STRESS CONCERN      4/28/2025   Nutrition   Diet: Regular (no restrictions)         4/28/2025   Exercise   Days per week of moderate/strenous exercise 0 days   Average minutes spent exercising at this level 0 min   (!) EXERCISE CONCERN      4/28/2025   Social Factors   Frequency of gathering with friends or relatives Never   Worry food won't last until get money to buy more No   Food not last or not have enough money for food? No   Do you have housing? (Housing is defined as stable permanent housing and does not include staying outside in a car, in a tent, in an abandoned building, in an overnight shelter, or couch-surfing.) Yes   Are you worried about losing your housing? No   Lack of transportation? No   Unable to get utilities (heat,electricity)? No   (!) SOCIAL CONNECTIONS CONCERN      4/28/2025   Fall Risk   Fallen 2 or more times in the past year? No   Trouble with walking or balance? No          4/28/2025   Activities of Daily Living- Home Safety   Needs help with the following daily activites None of the above   Safety concerns in the home No grab bars in the bathroom         4/28/2025   Dental   Dentist two times every year? (!) NO         4/28/2025   Hearing Screening   Hearing concerns? (!) TROUBLE UNDERSTANDING SOFT OR WHISPERED SPEECH.    None of the above       Multiple values from one day are sorted in reverse-chronological order         4/28/2025   Driving Risk Screening   Patient/family members have concerns about driving No         4/28/2025   General Alertness/Fatigue Screening   Have you been more tired than usual lately? (!) YES         4/28/2025   Urinary Incontinence Screening   Bothered by leaking urine in past 6 months No       Today's PHQ-9 Score:       4/28/2025     2:19 PM   PHQ-9 SCORE   PHQ-9 Total Score MyChart 17 (Moderately severe depression)    PHQ-9 Total Score 17        Patient-reported         4/28/2025   Substance Use   Alcohol more than 3/day or more than 7/wk Yes   How often do you have a drink containing alcohol 2 to 3 times a week   How many alcohol drinks on typical day 7 to 9   How often do you have 5+ drinks at one occasion Weekly   Audit 2/3 Score 6   How often not able to stop drinking once started Daily or almost daily   How often failed to do what normally expected Weekly   How often needed first drink in am after a heavy drinking session Never   How often feeling of guilt or remorse after drinking Never   How often unable to remember what happened the night before Never   Have you or someone else been injured because of your drinking No   Has anyone been concerned or suggested you cut down on drinking Yes, during the last year   TOTAL SCORE - AUDIT 20   Do you have a current opioid prescription? No   How severe/bad is pain from 1 to 10? 7/10   Do you use any other substances recreationally? No     Social History     Tobacco Use    Smoking status: Every Day     Current packs/day: 1.00     Types: Cigarettes    Smokeless tobacco: Current    Tobacco comments:     Used to smoke up to 2 ppd. Smoking since 20 yo   Vaping Use    Vaping status: Never Used   Substance Use Topics    Alcohol use: Yes     Comment: couple a week - 3-4 beers each time    Drug use: Never           4/28/2025   AAA Screening   Family history of Abdominal Aortic Aneurysm (AAA)? Unsure   Last PSA:   Prostate Specific Antigen Screen   Date Value Ref Range Status   09/08/2023 4.38 0.00 - 4.50 ng/mL Final     ASCVD Risk   The 10-year ASCVD risk score (Jemma ESQUIVEL, et al., 2019) is: 18.4%    Values used to calculate the score:      Age: 64 years      Sex: Male      Is Non- : No      Diabetic: No      Tobacco smoker: Yes      Systolic Blood Pressure: 134 mmHg      Is BP treated: No      HDL Cholesterol: 46 mg/dL      Total Cholesterol: 181  mg/dL    Fracture Risk Assessment Tool  Link to Frax Calculator  Use the information below to complete the Frax calculator  : 1960  Sex: male  Weight (kg): 118.8 kg (actual weight)  Height (cm): 182 cm  Previous Fragility Fracture:  No  History of parent with fractured hip:  No  Current Smoking:  Yes  Patient has been on glucocorticoids for more than 3 months (5mg/day or more): No  Rheumatoid Arthritis on Problem List:  No  Secondary Osteoporosis on Problem List:  No  Consumes 3 or more units of alcohol per day: No  Femoral Neck BMD (g/cm2)            Reviewed and updated as needed this visit by Provider   Tobacco  Allergies  Meds  Problems  Med Hx  Surg Hx  Fam Hx  Soc   Hx Sexual Activity          Past Medical History:   Diagnosis Date    Elevated prostate specific antigen (PSA) 2023    Lipoma of skin and subcutaneous tissue     Pre-diabetes      Past Surgical History:   Procedure Laterality Date    EXCISE MASS BACK Right 2023    Procedure: EXCISION, MASS, RIGHT BACK;  Surgeon: Malcolm Gann MD;  Location: [facility] OR    right knee replacement      TONSILLECTOMY & ADENOIDECTOMY       BP Readings from Last 3 Encounters:   25 134/84   24 (!) 140/84   23 (!) 132/90    Wt Readings from Last 3 Encounters:   25 118.8 kg (262 lb)   24 130.5 kg (287 lb 12.8 oz)   23 127 kg (280 lb)                  Patient Active Problem List   Diagnosis    Morbid obesity (H)    Tobacco abuse disorder    Moderate episode of recurrent major depressive disorder (H)    Generalized anxiety disorder    History of prediabetes    Low testosterone in male    Obstructive sleep apnea syndrome    Actinic keratosis    Neoplasm of uncertain behavior of skin    Cervical radiculopathy     Past Surgical History:   Procedure Laterality Date    EXCISE MASS BACK Right 2023    Procedure: EXCISION, MASS, RIGHT BACK;  Surgeon: Malcolm Gann MD;  Location: [facility] OR    right knee replacement       TONSILLECTOMY & ADENOIDECTOMY         Social History     Tobacco Use    Smoking status: Every Day     Current packs/day: 0.75     Average packs/day: 0.8 packs/day for 45.4 years (34.0 ttl pk-yrs)     Types: Cigarettes     Start date: 1980    Smokeless tobacco: Current     Types: Chew    Tobacco comments:     Used to smoke up to 2 ppd. Smoking since 18 yo   Substance Use Topics    Alcohol use: Not Currently     Comment: since 3/2025     Family History   Problem Relation Age of Onset    Cancer Mother         lung cancer - smoker    No Known Problems Father     Unknown/Adopted Sister     Unknown/Adopted Sister     Unknown/Adopted Sister          Current Outpatient Medications   Medication Sig Dispense Refill    FLUoxetine (PROZAC) 20 MG capsule Take 1 capsule (20 mg) by mouth daily. 30 capsule 1    insulin pen needle (32G X 4 MM) 32G X 4 MM miscellaneous Use 1 pen needles every 2 weeks 10 each 1    oxyCODONE (ROXICODONE) 5 MG tablet Take 1 tablet (5 mg) by mouth every 6 hours as needed for pain. 12 tablet 0     No Known Allergies  Recent Labs   Lab Test 04/29/25  1454 08/07/23  0825   A1C  --  5.6   LDL  --  112*   HDL  --  46   TRIG  --  114   ALT 14 23   CR 0.77 0.74   GFRESTIMATED >90 >90   POTASSIUM 3.8 4.4   TSH 1.28 2.46      Current providers sharing in care for this patient include:  Patient Care Team:  Neel Bradley MD as PCP - General (Family Medicine)  Neel Bradley MD as Assigned PCP  Slava Scott MD as MD (Urology)  Farzana Blunt, PA-C as Physician Assistant (Dermatology)  Malcolm Gann MD as Assigned Heart and Vascular Surgical Provider    The following health maintenance items are reviewed in Epic and correct as of today:  Health Maintenance   Topic Date Due    COLORECTAL CANCER SCREENING  Never done    ZOSTER IMMUNIZATION (2 of 2) 10/02/2023    MEDICARE ANNUAL WELLNESS VISIT  08/07/2024    ANNUAL REVIEW OF HM ORDERS  08/07/2024    INFLUENZA VACCINE (1) 09/01/2024    COVID-19 Vaccine (1 -  "2024-25 season) Never done    LUNG CANCER SCREENING  11/22/2024    PHQ-9  10/29/2025    FALL RISK ASSESSMENT  04/29/2026    DIABETES SCREENING  08/07/2026    LIPID  08/07/2028    ADVANCE CARE PLANNING  08/12/2028    DTAP/TDAP/TD IMMUNIZATION (2 - Td or Tdap) 08/07/2033    RSV VACCINE (1 - 1-dose 75+ series) 05/20/2034    HEPATITIS C SCREENING  Completed    HIV SCREENING  Completed    DEPRESSION ACTION PLAN  Completed    Pneumococcal Vaccine: 50+ Years  Completed    HPV IMMUNIZATION  Aged Out    MENINGITIS IMMUNIZATION  Aged Out         Review of Systems  Constitutional, HEENT, cardiovascular, pulmonary, GI, , musculoskeletal, neuro, skin, endocrine and psych systems are negative, except as otherwise noted.     Objective    Exam  /84   Pulse 82   Temp 97.8  F (36.6  C) (Temporal)   Resp 20   Ht 1.82 m (5' 11.65\")   Wt 118.8 kg (262 lb)   SpO2 98%   BMI 35.88 kg/m     Estimated body mass index is 35.88 kg/m  as calculated from the following:    Height as of this encounter: 1.82 m (5' 11.65\").    Weight as of this encounter: 118.8 kg (262 lb).    Physical Exam  GENERAL: Alert and no distress, speaking in full sentences.  He is morbidly obese.  EYES: Eyes grossly normal to inspection, PERRL and conjunctivae and sclerae normal.  No nystagmus.  All 4 visual fields are intact  HENT: Ear canals and TM's normal.  Nares are non-congested. Oropharynx is pink and moist. No tender with palpation to the sinuses.  NECK: no adenopathy, supple, no lymphadenopathy or thyromegaly.  Mild tender with palpation to the cervical spine or its paraspinous muscle bilaterally.  No JV distention or carotid bruits  RESP: lungs clear to auscultation - no rales, rhonchi or wheezes.  Decreased breath sounds throughout  CV: regular rate and rhythm, no murmur.  ABDOMEN: soft, nondistended, nontender, no palpable masses or organomegaly with normal bowel sounds.  MS: No gross musculoskeletal defects noted, no edema.  Walk with no " limping, normal gait.  All 4 extremities are equally in strength. Ankle, knees, hips, shoulders, elbows and wrists exams normal.  Normal fine motor skills on fingers.  Back is straight, no lordosis or scoliosis.  No tender with palpation to the lower spine or its para-spinous process.  SKIN: Above the waist skin exam was normal, no concerning lesions appreciated.  Offered and recommended below the waist, skin exam but he declined.  NEURO: Normal strength and tone, mentation intact and speech normal.  Cranial nerves II through XII intact, DTR +2 throughout, no focal neurological deficit.  No asterixis or tremor  PSYCH: mentation appears normal, affect flat.  Thoughts intact, no hallucination.  No suicidal or homicidal ideation.  LYMPH: no cervical and supraclavicular adenopathy.   (male): Offered and recommended but he declined.        Results for orders placed or performed in visit on 04/29/25   Comprehensive metabolic panel (BMP + Alb, Alk Phos, ALT, AST, Total. Bili, TP)     Status: Abnormal   Result Value Ref Range    Sodium 141 135 - 145 mmol/L    Potassium 3.8 3.4 - 5.3 mmol/L    Carbon Dioxide (CO2) 25 22 - 29 mmol/L    Anion Gap 11 7 - 15 mmol/L    Urea Nitrogen 13.1 8.0 - 23.0 mg/dL    Creatinine 0.77 0.67 - 1.17 mg/dL    GFR Estimate >90 >60 mL/min/1.73m2    Calcium 9.6 8.8 - 10.4 mg/dL    Chloride 105 98 - 107 mmol/L    Glucose 105 (H) 70 - 99 mg/dL    Alkaline Phosphatase 103 40 - 150 U/L    AST 14 0 - 45 U/L    ALT 14 0 - 70 U/L    Protein Total 7.6 6.4 - 8.3 g/dL    Albumin 4.5 3.5 - 5.2 g/dL    Bilirubin Total 0.4 <=1.2 mg/dL   PSA, screen     Status: Abnormal   Result Value Ref Range    Prostate Specific Antigen Screen 7.56 (H) 0.00 - 4.50 ng/mL    Narrative    This result is obtained using the Roche Elecsys total PSA method on the meme e601 immunoassay analyzer, which is an ultrasensitive method. Results obtained with different assay methods or kits cannot be used interchangeably.  This test is  intended for initial prostate cancer screening. PSA values exceeding the age-specific limits are suspicious for prostate disease, but additional testing, such as prostate biopsy, is needed to diagnose prostate pathology. The American Cancer Society recommends annual examination with digital rectal examination and serum PSA beginning at age 50 and for men with a life expectancy of at least 10 years after detection of prostate cancer. For men in high-risk groups, such as  Americans or men with a first-degree relative diagnosed at a younger age, testing should begin at a younger age. It is generally recommended that information be provided to patients about the benefits and limitations of testing and treatment so they can make informed decisions.   TSH with free T4 reflex     Status: Normal   Result Value Ref Range    TSH 1.28 0.30 - 4.20 uIU/mL   Sex Hormone Binding Globulin     Status: Normal   Result Value Ref Range    Sex Hormone Binding Globulin 37 11 - 80 nmol/L   Testosterone Free and Total     Status: Abnormal   Result Value Ref Range    Free Testosterone Calculated 3.64 ng/dL    Testosterone Total 205 (L) 240 - 950 ng/dL    Narrative    This test was developed and its performance characteristics determined by the [facility],  [facility]. It has not been cleared or approved by the FDA. The laboratory is regulated under CLIA as qualified to perform high-complexity testing. This test is used for clinical purposes. It should not be regarded as investigational or for research.   Testosterone Free and Total     Status: Abnormal    Narrative    The following orders were created for panel order Testosterone Free and Total.  Procedure                               Abnormality         Status                     ---------                               -----------         ------                     Sex Hormone Binding Juanita...[[id number]]  Normal              Final result                Testosterone Free and T...[[id number]]  Abnormal            Final result                 Please view results for these tests on the individual orders.                  4/29/2025   Mini Cog   Clock Draw Score 2 Normal   3 Item Recall 3 objects recalled   Mini Cog Total Score 5         Vision Screen         Signed Electronically by: Neel Schmitz Mai, MD      Answers submitted by the patient for this visit:  Patient Health Questionnaire (Submitted on 4/28/2025)  If you checked off any problems, how difficult have these problems made it for you to do your work, take care of things at home, or get along with other people?: Very difficult  PHQ9 TOTAL SCORE: 17

## 2025-04-29 NOTE — PATIENT INSTRUCTIONS
Patient Education   Preventive Care Advice   This is general advice given by our system to help you stay healthy. However, your care team may have specific advice just for you. Please talk to your care team about your preventive care needs.  Nutrition  Eat 5 or more servings of fruits and vegetables each day.  Try wheat bread, brown rice and whole grain pasta (instead of white bread, rice, and pasta).  Get enough calcium and vitamin D. Check the label on foods and aim for 100% of the RDA (recommended daily allowance).  Lifestyle  Exercise at least 150 minutes each week  (30 minutes a day, 5 days a week).  Do muscle strengthening activities 2 days a week. These help control your weight and prevent disease.  No smoking.  Wear sunscreen to prevent skin cancer.  Have a dental exam and cleaning every 6 months.  Yearly exams  See your health care team every year to talk about:  Any changes in your health.  Any medicines your care team has prescribed.  Preventive care, family planning, and ways to prevent chronic diseases.  Shots (vaccines)   HPV shots (up to age 26), if you've never had them before.  Hepatitis B shots (up to age 59), if you've never had them before.  COVID-19 shot: Get this shot when it's due.  Flu shot: Get a flu shot every year.  Tetanus shot: Get a tetanus shot every 10 years.  Pneumococcal, hepatitis A, and RSV shots: Ask your care team if you need these based on your risk.  Shingles shot (for age 50 and up)  General health tests  Diabetes screening:  Starting at age 35, Get screened for diabetes at least every 3 years.  If you are younger than age 35, ask your care team if you should be screened for diabetes.  Cholesterol test: At age 39, start having a cholesterol test every 5 years, or more often if advised.  Bone density scan (DEXA): At age 50, ask your care team if you should have this scan for osteoporosis (brittle bones).  Hepatitis C: Get tested at least once in your life.  STIs (sexually  transmitted infections)  Before age 24: Ask your care team if you should be screened for STIs.  After age 24: Get screened for STIs if you're at risk. You are at risk for STIs (including HIV) if:  You are sexually active with more than one person.  You don't use condoms every time.  You or a partner was diagnosed with a sexually transmitted infection.  If you are at risk for HIV, ask about PrEP medicine to prevent HIV.  Get tested for HIV at least once in your life, whether you are at risk for HIV or not.  Cancer screening tests  Cervical cancer screening: If you have a cervix, begin getting regular cervical cancer screening tests starting at age 21.  Breast cancer scan (mammogram): If you've ever had breasts, begin having regular mammograms starting at age 40. This is a scan to check for breast cancer.  Colon cancer screening: It is important to start screening for colon cancer at age 45.  Have a colonoscopy test every 10 years (or more often if you're at risk) Or, ask your provider about stool tests like a FIT test every year or Cologuard test every 3 years.  To learn more about your testing options, visit:   .  For help making a decision, visit:   https://bit.ly/ez48759.  Prostate cancer screening test: If you have a prostate, ask your care team if a prostate cancer screening test (PSA) at age 55 is right for you.  Lung cancer screening: If you are a current or former smoker ages 50 to 80, ask your care team if ongoing lung cancer screenings are right for you.  For informational purposes only. Not to replace the advice of your health care provider. Copyright   2023 [facility]. All rights reserved. Clinically reviewed by the [facility] Transitions Program. [employer] 601221 - REV 01/24.  Learning About Activities of Daily Living  What are activities of daily living?     Activities of daily living (ADLs) are the basic self-care tasks you do every day. These include eating, bathing, dressing,  and moving around.  As you age, and if you have health problems, you may find that it's harder to do some of these tasks. If so, your doctor can suggest ideas that may help.  To measure what kind of help you may need, your doctor will ask how well you are able to do ADLs. Let your doctor know if there are any tasks that you are having trouble doing. This is an important first step to getting help. And when you have the help you need, you can stay as independent as possible.  How will a doctor assess your ADLs?  Asking about ADLs is part of a routine health checkup your doctor will likely do as you age. Your health check might be done in a doctor's office, in your home, or at a hospital. The goal is to find out if you are having any problems that could make it hard to care for yourself or that make it unsafe for you to be on your own.  To measure your ADLs, your doctor will ask how hard it is for you to do routine tasks. Your doctor may also want to know if you have changed the way you do a task because of a health problem. Your doctor may watch how you:  Walk back and forth.  Keep your balance while you stand or walk.  Move from sitting to standing or from a bed to a chair.  Button or unbutton a shirt or sweater.  Remove and put on your shoes.  It's common to feel a little worried or anxious if you find you can't do all the things you used to be able to do. Talking with your doctor about ADLs is a way to make sure you're as safe as possible and able to care for yourself as well as you can. You may want to bring a caregiver, friend, or family member to your checkup. They can help you talk to your doctor.  Follow-up care is a key part of your treatment and safety. Be sure to make and go to all appointments, and call your doctor if you are having problems. It's also a good idea to know your test results and keep a list of the medicines you take.  Current as of: October 24, 2024  Content Version: 14.4    9633-2468  [employer].   Care instructions adapted under license by your healthcare professional. If you have questions about a medical condition or this instruction, always ask your healthcare professional. [employer] disclaims any warranty or liability for your use of this information.    Hearing Loss: Care Instructions  Overview     Hearing loss is a sudden or slow decrease in how well you hear. It can range from slight to profound. Permanent hearing loss can occur with aging. It also can happen when you are exposed long-term to loud noise. Examples include listening to loud music, riding motorcycles, or being around other loud machines.  Hearing loss can affect your work and home life. It can make you feel lonely or depressed. You may feel that you have lost your independence. But hearing aids and other devices can help you hear better and feel connected to others.  Follow-up care is a key part of your treatment and safety. Be sure to make and go to all appointments, and call your doctor if you are having problems. It's also a good idea to know your test results and keep a list of the medicines you take.  How can you care for yourself at home?  Avoid loud noises whenever possible. This helps keep your hearing from getting worse.  Always wear hearing protection around loud noises.  Wear a hearing aid as directed.  A professional can help you pick a hearing aid that will work best for you.  You can also get hearing aids over the counter for mild to moderate hearing loss.  Have hearing tests as your doctor suggests. They can show whether your hearing has changed. Your hearing aid may need to be adjusted.  Use other devices as needed. These may include:  Telephone amplifiers and hearing aids that can connect to a television, stereo, radio, or microphone.  Devices that use lights or vibrations. These alert you to the doorbell, a ringing telephone, or a baby monitor.  Television closed-captioning. This  "shows the words at the bottom of the screen. Most new TVs can do this.  TTY (text telephone). This lets you type messages back and forth on the telephone instead of talking or listening. These devices are also called TDD. When messages are typed on the keyboard, they are sent over the phone line to a receiving TTY. The message is shown on a monitor.  Use text messaging, social media, and email if it is hard for you to communicate by telephone.  Try to learn a listening technique called speechreading. It is not lipreading. You pay attention to people's gestures, expressions, posture, and tone of voice. These clues can help you understand what a person is saying. Face the person you are talking to, and have them face you. Make sure the lighting is good. You need to see the other person's face clearly.  Think about counseling if you need help to adjust to your hearing loss.  When should you call for help?  Watch closely for changes in your health, and be sure to contact your doctor if:    You think your hearing is getting worse.     You have new symptoms, such as dizziness or nausea.   Where can you learn more?  Go to https://www.[employer].net/patiented  Enter R798 in the search box to learn more about \"Hearing Loss: Care Instructions.\"  Current as of: October 27, 2024  Content Version: 14.4    7094-2053 [employer].   Care instructions adapted under license by your healthcare professional. If you have questions about a medical condition or this instruction, always ask your healthcare professional. [employer] disclaims any warranty or liability for your use of this information.    Relationships for Good Health  Relationships are important for our health and happiness. Social isolation, loneliness and lack of support are bad for your health. Studies show that loneliness can harm health and limit your life span as much as high blood pressure and smoking.   Take some time to reflect on your " relationships. Then answer these questions:  Are there people in your life that cause you stress or drain your energy? What can you do to set limits?  ________________________________________________________________________________________________________________________________________________________________________________________________________________________________________________________________________________________________________________________________________________  Who do you enjoy spending time with? Who can you go to for support?  ________________________________________________________________________________________________________________________________________________________________________________________________________________________________________________________________________________________________________________________________________________  What can you do to improve your relationships with others?  __________________________________________________________________________________________________________________________________________________________________________________________________________________  ______________________________________________________________________________________________________________________________  What do you like most about your relationships with others?  ________________________________________________________________________________________________________________________________________________________________________________________________________________________________________________________________________________________________________________________________________________  My goal: ______________________________________________________________________  I will:  ______________________________________________________________________________________________________________________________________________________________________________________________    For informational purposes only. Not to replace the advice of your health care provider. Copyright   2018 [facility]. All rights reserved. Clinically reviewed by [facility]  Team. [employer] 729307 - Rev 06/24.  Learning About Stress  What is stress?     Stress is your body's response to a hard situation. Your body can have a physical, emotional, or mental response. Stress is a fact of life for most people, and it affects everyone differently. What causes stress for you may not be stressful for someone else.  A lot of things can cause stress. You may feel stress when you go on a job interview, take a test, or run a race. This kind of short-term stress is normal and even useful. It can help you if you need to work hard or react quickly. For example, stress can help you finish an important job on time.  Long-term stress is caused by ongoing stressful situations or events. Examples of long-term stress include long-term health problems, ongoing problems at work, or conflicts in your family. Long-term stress can harm your health.  How does stress affect your health?  When you are stressed, your body responds as though you are in danger. It makes hormones that speed up your heart, make you breathe faster, and give you a burst of energy. This is called the fight-or-flight stress response. If the stress is over quickly, your body goes back to normal and no harm is done.  But if stress happens too often or lasts too long, it can have bad effects. Long-term stress can make you more likely to get sick, and it can make symptoms of some diseases worse. If you tense up when you are stressed, you may develop neck, shoulder, or low back pain. Stress is linked to high blood pressure and heart disease.  Stress also  harms your emotional health. It can make you sanchez, tense, or depressed. Your relationships may suffer, and you may not do well at work or school.  What can you do to manage stress?  You can try these things to help manage stress:   Do something active. Exercise or activity can help reduce stress. Walking is a great way to get started. Even everyday activities such as housecleaning or yard work can help.  Try yoga or nellie chi. These techniques combine exercise and meditation. You may need some training at first to learn them.  Do something you enjoy. For example, listen to music or go to a movie. Practice your hobby or do volunteer work.  Meditate. This can help you relax, because you are not worrying about what happened before or what may happen in the future.  Do guided imagery. Imagine yourself in any setting that helps you feel calm. You can use online videos, books, or a teacher to guide you.  Do breathing exercises. For example:  From a standing position, bend forward from the waist with your knees slightly bent. Let your arms dangle close to the floor.  Breathe in slowly and deeply as you return to a standing position. Roll up slowly and lift your head last.  Hold your breath for just a few seconds in the standing position.  Breathe out slowly and bend forward from the waist.  Let your feelings out. Talk, laugh, cry, and express anger when you need to. Talking with supportive friends or family, a counselor, or a emilia leader about your feelings is a healthy way to relieve stress. Avoid discussing your feelings with people who make you feel worse.  Write. It may help to write about things that are bothering you. This helps you find out how much stress you feel and what is causing it. When you know this, you can find better ways to cope.  What can you do to prevent stress?  You might try some of these things to help prevent stress:  Manage your time. This helps you find time to do the things you want and need to  "do.  Get enough sleep. Your body recovers from the stresses of the day while you are sleeping.  Get support. Your family, friends, and community can make a difference in how you experience stress.  Limit your news feed. Avoid or limit time on social media or news that may make you feel stressed.  Do something active. Exercise or activity can help reduce stress. Walking is a great way to get started.  Where can you learn more?  Go to https://www.[employer].MagTag/patiented  Enter N032 in the search box to learn more about \"Learning About Stress.\"  Current as of: October 24, 2024  Content Version: 14.4    3218-1064 [employer].   Care instructions adapted under license by your healthcare professional. If you have questions about a medical condition or this instruction, always ask your healthcare professional. [employer] disclaims any warranty or liability for your use of this information.    Learning About Sleeping Well  What does sleeping well mean?     Sleeping well means getting enough sleep to feel good and stay healthy. How much sleep is enough varies among people.  The number of hours you sleep and how you feel when you wake up are both important. If you do not feel refreshed, you probably need more sleep. Another sign of not getting enough sleep is feeling tired during the day.  Experts recommend that adults get at least 7 or more hours of sleep per day. Children and older adults need more sleep.  Why is getting enough sleep important?  Getting enough quality sleep is a basic part of good health. When your sleep suffers, your physical health, mood, and your thoughts can suffer too. You may find yourself feeling more grumpy or stressed. Not getting enough sleep also can lead to serious problems, including injury, accidents, anxiety, and depression.  What might cause poor sleeping?  Many things can cause sleep problems, including:  Changes to your sleep schedule.  Stress. Stress can be caused " "by fear about a single event, such as giving a speech. Or you may have ongoing stress, such as worry about work or school.  Depression, anxiety, and other mental or emotional conditions.  Changes in your sleep habits or surroundings. This includes changes that happen where you sleep, such as noise, light, or sleeping in a different bed. It also includes changes in your sleep pattern, such as having jet lag or working a late shift.  Health problems, such as pain, breathing problems, and restless legs syndrome.  Lack of regular exercise.  Using alcohol, nicotine, or caffeine before bed.  How can you help yourself?  Here are some tips that may help you sleep more soundly and wake up feeling more refreshed.  Your sleeping area   Use your bedroom only for sleeping and sex. A bit of light reading may help you fall asleep. But if it doesn't, do your reading elsewhere in the house. Try not to use your TV, computer, smartphone, or tablet while you are in bed.  Be sure your bed is big enough to stretch out comfortably, especially if you have a sleep partner.  Keep your bedroom quiet, dark, and cool. Use curtains, blinds, or a sleep mask to block out light. To block out noise, use earplugs, soothing music, or a \"white noise\" machine.  Your evening and bedtime routine   Create a relaxing bedtime routine. You might want to take a warm shower or bath, or listen to soothing music.  Go to bed at the same time every night. And get up at the same time every morning, even if you feel tired.  What to avoid   Limit caffeine (coffee, tea, caffeinated sodas) during the day, and don't have any for at least 6 hours before bedtime.  Avoid drinking alcohol before bedtime. Alcohol can cause you to wake up more often during the night.  Try not to smoke or use tobacco, especially in the evening. Nicotine can keep you awake.  Limit naps during the day, especially close to bedtime.  Avoid lying in bed awake for too long. If you can't fall asleep " "or if you wake up in the middle of the night and can't get back to sleep within about 20 minutes, get out of bed and go to another room until you feel sleepy.  Avoid taking medicine right before bed that may keep you awake or make you feel hyper or energized. Your doctor can tell you if your medicine may do this and if you can take it earlier in the day.  If you can't sleep   Imagine yourself in a peaceful, pleasant scene. Focus on the details and feelings of being in a place that is relaxing.  Get up and do a quiet or boring activity until you feel sleepy.  Avoid drinking any liquids before going to bed to help prevent waking up often to use the bathroom.  Where can you learn more?  Go to https://www.[employer].net/patiented  Enter J942 in the search box to learn more about \"Learning About Sleeping Well.\"  Current as of: July 31, 2024  Content Version: 14.4    0219-4736 [employer].   Care instructions adapted under license by your healthcare professional. If you have questions about a medical condition or this instruction, always ask your healthcare professional. [employer] disclaims any warranty or liability for your use of this information.    Learning About Depression Screening  What is depression screening?  Depression screening is a way to see if you have depression symptoms. It may be done by a doctor or counselor. It's often part of a routine checkup. That's because your mental health is just as important as your physical health.  Depression is a mental health condition that affects how you feel, think, and act. You may:  Have less energy.  Lose interest in your daily activities.  Feel sad and grouchy for a long time.  Depression is very common. It affects people of all ages.  Many things can lead to depression. Some people become depressed after they have a stroke or find out they have a major illness like cancer or heart disease. The death of a loved one or a breakup may lead to " "depression. It can run in families. Most experts believe that a combination of inherited genes and stressful life events can cause it.  What happens during screening?  You may be asked to fill out a form about your depression symptoms. You and the doctor will discuss your answers. The doctor may ask you more questions to learn more about how you think, act, and feel.  What happens after screening?  If you have symptoms of depression, your doctor will talk to you about your options.  Doctors usually treat depression with medicines or counseling. Often, combining the two works best. Many people don't get help because they think that they'll get over the depression on their own. But people with depression may not get better unless they get treatment.  The cause of depression is not well understood. There may be many factors involved. But if you have depression, it's not your fault.  A serious symptom of depression is thinking about death or suicide. If you or someone you care about talks about this or about feeling hopeless, get help right away.  It's important to know that depression can be treated. Medicine, counseling, and self-care may help.  Where can you learn more?  Go to https://www.[employer]Sn[employer].net/patiented  Enter T185 in the search box to learn more about \"Learning About Depression Screening.\"  Current as of: July 31, 2024  Content Version: 14.4    0925-4992 [employer].   Care instructions adapted under license by your healthcare professional. If you have questions about a medical condition or this instruction, always ask your healthcare professional. [employer] disclaims any warranty or liability for your use of this information.    9 Ways to Cut Back on Drinking  Maybe you've found yourself drinking more alcohol than you'd prefer. If you want to cut back, here are some ideas to try.    Think before you drink.  Do you really want a drink, or is it just a habit? If you're used to having " "a drink at a certain time, try doing something else then.     Look for substitutes.  Find some no-alcohol drinks that you enjoy, like flavored seltzer water, tea with honey, or tonic with a slice of lime. Or try alcohol-free beer or \"virgin\" cocktails (without the alcohol).     Drink more water.  Use water to quench your thirst. Drink a glass of water before you have any alcohol. Have another glass along with every drink or between drinks.     Shrink your drink.  For example, have a bottle of beer instead of a pint. Use a smaller glass for wine. Choose drinks with lower alcohol content (ABV%). Or use less liquor and more mixer in cocktails.     Slow down.  It's easy to drink quickly and without thinking about it. Pay attention, and make each drink last longer.     Do the math.  Total up how much you spend on alcohol each month. How much is that a year? If you cut back, what could you do with the money you save?     Take a break.  Choose a day or two each week when you won't drink at all. Notice how you feel on those days, physically and emotionally. How did you sleep? Do you feel better? Over time, add more break days.     Count calories.  Would you like to lose some weight? For some people that's a good motivator for cutting back. Figure out how many calories are in each drink. How many does that add up to in a day? In a week? In a month?     Practice saying no.  Be ready when someone offers you a drink. Try: \"Thanks, I've had enough.\" Or \"Thanks, but I'm cutting back.\" Or \"No, thanks. I feel better when I drink less.\"   Current as of: August 20, 2024  Content Version: 14.4    9628-2804 [employer].   Care instructions adapted under license by your healthcare professional. If you have questions about a medical condition or this instruction, always ask your healthcare professional. [employer] disclaims any warranty or liability for your use of this information.     " Frequently Asked Questions  If you are at high-risk for lung cancer, getting screened with low-dose computed tomography (LDCT) every year can help save your life. This handout offers answers to some of the most common questions about lung cancer screening. If you have other questions, please call [phone] ([phone]).     What is it?  Lung cancer screening uses special X-ray technology to create an image of your lung tissue. The exam is quick and easy and takes less than 10 seconds. We don t give you any medicine or use any needles. You can eat before and after the exam. You don t need to change your clothes as long as the clothing on your chest doesn t contain metal. But, you do need to be able to hold your breath for at least 6 seconds during the exam.    What is the goal of lung cancer screening?  The goal of lung cancer screening is to save lives. Many times, lung cancer is not found until a person starts having physical symptoms. Lung cancer screening can help detect lung cancer in the earliest stages when it may be easier to treat.    Who should be screened for lung cancer?  We suggest lung cancer screening for anyone who is at high-risk for lung cancer. You are in the high-risk group if you:      are between the ages of 55 and 79, and    have smoked at least 1 pack of cigarettes a day for 20 or more years, and    still smoke or have quit within the past 15 years.    However, if you have a new cough or shortness of breath, you should talk to your doctor before being screened.    Why does it matter if I have symptoms?  Certain symptoms can be a sign that you have a condition in your lungs that should be checked and treated by your doctor. These symptoms include fever, chest pain, a new or changing cough, shortness of breath that you have never felt before, coughing up blood or unexplained weight loss. Having any of these symptoms can greatly affect the results of lung cancer screening.        Should all smokers get an LDCT lung cancer screening exam?  It depends. Lung cancer screening is for a very specific group of men and women who have a history of heavy smoking over a long period of time (see  Who should be screened for lung cancer  above).  I am in the high-risk group, but have been diagnosed with cancer in the past. Is LDCT lung cancer screening right for me?  In some cases, you should not have LDCT lung screening, such as when your doctor is already following your cancer with CT scan studies. Your doctor will help you decide if LDCT lung screening is right for you.  Do I need to have a screening exam every year?  Yes. If you are in the high-risk group described earlier, you should get an LDCT lung cancer screening exam every year until you are 79, or are no longer willing or able to undergo screening and possible procedures to diagnose and treat lung cancer.  How effective is LDCT at preventing death from lung cancer?  Studies have shown that LDCT lung cancer screening can lower the risk of death from lung cancer by 20 percent in people who are at high-risk.  What are the risks?  There are some risks and limitations of LDCT lung cancer screening. We want to make sure you understand the risks and benefits, so please let us know if you have any questions. Your doctor may want to talk with you more about these risks.    Radiation exposure: As with any exam that uses radiation, there is a very small increased risk of cancer. The amount of radiation in LDCT is small--about the same amount a person would get from a mammogram. Your doctor orders the exam when he or she feels the potential benefits outweigh the risks.    False negatives: No test is perfect, including LDCT. It is possible that you may have a medical condition, including lung cancer, that is not found during your exam. This is called a false negative result.    False positives and more testing: LDCT very often finds something in the  lung that could be cancer, but in fact is not. This is called a false positive result. False positive tests often cause anxiety. To make sure these findings are not cancer, you may need to have more tests. These tests will be done only if you give us permission. Sometimes patients need a treatment that can have side effects, such as a biopsy. For more information on false positives, see  What can I expect from the results?     Findings not related to lung cancer: Your LDCT exam also takes pictures of areas of your body next to your lungs. In a very small number of cases, the CT scan will show an abnormal finding in one of these areas, such as your kidneys, adrenal glands, liver or thyroid. This finding may not be serious, but you may need more tests. Your doctor can help you decide what other tests you may need, if any.  What can I expect from the results?  About 1 out of 4 LDCT exams will find something that may need more tests. Most of the time, these findings are lung nodules. Lung nodules are very small collections of tissue in the lung. These nodules are very common, and the vast majority--more than 97 percent--are not cancer (benign). Most are normal lymph nodes or small areas of scarring from past infections.  But, if a small lung nodule is found to be cancer, the cancer can be cured more than 90 percent of the time. To know if the nodule is cancer, we may need to get more images before your next yearly screening exam. If the nodule has suspicious features (for example, it is large, has an odd shape or grows over time), we will refer you to a specialist for further testing.  Will my doctor also get the results?  Yes. Your doctor will get a copy of your results.  Is it okay to keep smoking now that there s a cancer screening exam?  No. Tobacco is one of the strongest cancer-causing agents. It causes not only lung cancer, but other cancers and cardiovascular (heart) diseases as well. The damage caused by  smoking builds over time. This means that the longer you smoke, the higher your risk of disease. While it is never too late to quit, the sooner you quit, the better.  Where can I find help to quit smoking?  The best way to prevent lung cancer is to stop smoking. If you have already quit smoking, congratulations and keep it up! For help on quitting smoking, please call [employer] at 6-240-QUITNOW ([phone]) or the American [employer] at 1-229.901.6392 to find local resources near you.  One-on-one health coaching:  If you d prefer to work individually with a health care provider on tobacco cessation, we offer:      Medication Therapy Management:  Our specially trained pharmacists work closely with you and your doctor to help you quit smoking.  Call [phone] or [phone] (toll free).

## 2025-05-01 LAB
TESTOST FREE SERPL-MCNC: 3.64 NG/DL
TESTOST SERPL-MCNC: 205 NG/DL (ref 240–950)

## 2025-05-10 NOTE — PROGRESS NOTES
Lung Cancer Screening Shared Decision Making Visit     You Abbott, a 64 year old male, is eligible for lung cancer screening    History   Smoking Status    Every Day    Types: Cigarettes   Smokeless Tobacco    Current    Types: Chew       I have discussed with patient the risks and benefits of screening for lung cancer with low-dose CT.     The risks include:    radiation exposure: one low dose chest CT has as much ionizing radiation as about 15 chest x-rays, or 6 months of background radiation living in [place]      false positives: most findings/nodules are NOT cancer, but some might still require additional diagnostic evaluation, including biopsy    over-diagnosis: some slow growing cancers that might never have been clinically significant will be detected and treated unnecessarily     The benefit of early detection of lung cancer is contingent upon adherence to annual screening or more frequent follow up if indicated.     Furthermore, to benefit from screening, You must be willing and able to undergo diagnostic procedures, if indicated. Although no specific guide is available for determining severity of comorbidities, it is reasonable to withhold screening in patients who have greater mortality risk from other diseases.     We did discuss that the best way to prevent lung cancer is to not smoke.    Some patients may value a numeric estimation of lung cancer risk when evaluating if lung cancer screening is right for them, here is one calculator:    ShouldIScreen      Answers submitted by the patient for this visit:  Patient Health Questionnaire (Submitted on 4/28/2025)  If you checked off any problems, how difficult have these problems made it for you to do your work, take care of things at home, or get along with other people?: Very difficult  PHQ9 TOTAL SCORE: 17

## 2025-05-12 ENCOUNTER — PATIENT OUTREACH (OUTPATIENT)
Dept: CARE COORDINATION | Facility: CLINIC | Age: 65
End: 2025-05-12
Payer: MEDICARE

## 2025-05-21 ENCOUNTER — OFFICE VISIT (OUTPATIENT)
Dept: UROLOGY | Facility: CLINIC | Age: 65
End: 2025-05-21
Payer: MEDICARE

## 2025-05-21 VITALS
SYSTOLIC BLOOD PRESSURE: 130 MMHG | TEMPERATURE: 97.3 F | DIASTOLIC BLOOD PRESSURE: 68 MMHG | BODY MASS INDEX: 35.49 KG/M2 | HEIGHT: 72 IN | WEIGHT: 262 LBS

## 2025-05-21 DIAGNOSIS — R39.9 LOWER URINARY TRACT SYMPTOMS (LUTS): ICD-10-CM

## 2025-05-21 DIAGNOSIS — R97.20 ELEVATED PROSTATE SPECIFIC ANTIGEN (PSA): Primary | ICD-10-CM

## 2025-05-21 LAB
ALBUMIN UR-MCNC: NEGATIVE MG/DL
APPEARANCE UR: CLEAR
BILIRUB UR QL STRIP: NEGATIVE
COLOR UR AUTO: YELLOW
GLUCOSE UR STRIP-MCNC: NEGATIVE MG/DL
HGB UR QL STRIP: NEGATIVE
KETONES UR STRIP-MCNC: NEGATIVE MG/DL
LEUKOCYTE ESTERASE UR QL STRIP: NEGATIVE
NITRATE UR QL: NEGATIVE
PH UR STRIP: 5.5 [PH] (ref 5–7)
SP GR UR STRIP: 1.02 (ref 1–1.03)
UROBILINOGEN UR STRIP-MCNC: NORMAL MG/DL

## 2025-05-21 PROCEDURE — 81003 URINALYSIS AUTO W/O SCOPE: CPT | Performed by: UROLOGY

## 2025-05-21 ASSESSMENT — PAIN SCALES - GENERAL: PAINLEVEL_OUTOF10: NO PAIN (0)

## 2025-05-21 NOTE — PROGRESS NOTES
Bladder Scan performed. 0 ml maximum residual urine detected after 3 scans. MD informed   Cristina Scott MA on 5/21/2025 at 8:19 AM

## 2025-05-21 NOTE — PROGRESS NOTES
S: You Abbott is a pleasant  65 year old male who was requested to be seen by  Neel Bradley for a consult with regard to patient's elevated PSA.  His recent PSA was found to be   Prostate Specific Antigen Screen   Date Value Ref Range Status   04/29/2025 7.56 (H) 0.00 - 4.50 ng/mL Final   .  His previous PSA was abnormal in 2023.  Patient complains of minimal LUTS.  He has history of elevated PSA.  His AUA Symptom Score:  low.  Current Outpatient Medications   Medication Sig Dispense Refill    FLUoxetine (PROZAC) 20 MG capsule Take 1 capsule (20 mg) by mouth daily. (Patient not taking: Reported on 5/21/2025) 30 capsule 1    insulin pen needle (32G X 4 MM) 32G X 4 MM miscellaneous Use 1 pen needles every 2 weeks (Patient not taking: Reported on 5/21/2025) 10 each 1      No Known Allergies   Past Medical History:   Diagnosis Date    Elevated prostate specific antigen (PSA) 09/08/2023    Lipoma of skin and subcutaneous tissue 8/7/2023    Pre-diabetes      Past Surgical History:   Procedure Laterality Date    EXCISE MASS BACK Right 9/6/2023    Procedure: EXCISION, MASS, RIGHT BACK;  Surgeon: Malcolm Gann MD;  Location: [facility] OR    right knee replacement      TONSILLECTOMY & ADENOIDECTOMY        Family History   Problem Relation Age of Onset    Cancer Mother         lung cancer - smoker    No Known Problems Father     Unknown/Adopted Sister     Unknown/Adopted Sister     Unknown/Adopted Sister      He does not have a family history of prostate cancer.  Social History     Socioeconomic History    Marital status: Single     Spouse name: partner - Lisandra    Number of children: Not on file    Years of education: Not on file    Highest education level: Not on file   Occupational History    Not on file   Tobacco Use    Smoking status: Every Day     Current packs/day: 0.75     Average packs/day: 0.8 packs/day for 45.4 years (34.0 ttl pk-yrs)     Types: Cigarettes     Start date: 1980    Smokeless tobacco: Current      Types: Chew    Tobacco comments:     Used to smoke up to 2 ppd. Smoking since 18 yo   Vaping Use    Vaping status: Never Used   Substance and Sexual Activity    Alcohol use: Not Currently     Comment: since 3/2025    Drug use: Never    Sexual activity: Yes     Partners: Female   Other Topics Concern    Not on file   Social History Narrative    Not on file     Social Drivers of Health     Financial Resource Strain: Low Risk  (4/28/2025)    Financial Resource Strain     Within the past 12 months, have you or your family members you live with been unable to get utilities (heat, electricity) when it was really needed?: No   Food Insecurity: Low Risk  (4/28/2025)    Food Insecurity     Within the past 12 months, did you worry that your food would run out before you got money to buy more?: No     Within the past 12 months, did the food you bought just not last and you didn t have money to get more?: No   Transportation Needs: Low Risk  (4/28/2025)    Transportation Needs     Within the past 12 months, has lack of transportation kept you from medical appointments, getting your medicines, non-medical meetings or appointments, work, or from getting things that you need?: No   Physical Activity: Inactive (4/28/2025)    Exercise Vital Sign     Days of Exercise per Week: 0 days     Minutes of Exercise per Session: 0 min   Stress: Stress Concern Present (4/28/2025)    [employer] - Occupational Stress Questionnaire     Feeling of Stress : To some extent   Social Connections: Unknown (4/28/2025)    Social Connection and Isolation Panel [NHANES]     Frequency of Communication with Friends and Family: Not on file     Frequency of Social Gatherings with Friends and Family: Never     Attends Faith Services: Not on file     Active Member of Clubs or Organizations: Not on file     Attends Club or Organization Meetings: Not on file     Marital Status: Not on file   Interpersonal Safety: Low Risk   "(1/31/2024)    Interpersonal Safety     Do you feel physically and emotionally safe where you currently live?: Yes     Within the past 12 months, have you been hit, slapped, kicked or otherwise physically hurt by someone?: No     Within the past 12 months, have you been humiliated or emotionally abused in other ways by your partner or ex-partner?: No   Housing Stability: Low Risk  (4/28/2025)    Housing Stability     Do you have housing? : Yes     Are you worried about losing your housing?: No        REVIEW OF SYSTEMS  =================  C: NEGATIVE for fever, chills, change in weight  I: NEGATIVE for worrisome rashes, moles or lesions  E/M: NEGATIVE for ear, mouth and throat problems  R: NEGATIVE for significant cough or SHORTNESS OF BREATH  CV:  NEGATIVE for chest pain, palpitations or peripheral edema  GI: NEGATIVE for nausea, abdominal pain, heartburn, or change in bowel habits  NEURO: NEGATIVE  PSYCH: NEGATIVE    Physical Exam:  /68 (BP Location: Right arm, Patient Position: Sitting, Cuff Size: Adult Large)   Temp 97.3  F (36.3  C) (Temporal)   Ht 1.82 m (5' 11.65\")   Wt 118.8 kg (262 lb)   BMI 35.88 kg/m     Patient is pleasant, in no acute distress, good general condition.  GENERAL: alert and no distress  EYES: Eyes grossly normal to inspection.  No discharge or erythema, or obvious scleral/conjunctival abnormalities.  RESP: No audible wheeze, cough, or visible cyanosis.    SKIN: Visible skin clear. No significant rash, abnormal pigmentation or lesions.  NEURO: Cranial nerves grossly intact.  Mentation and speech appropriate for age.  PSYCH: Appropriate affect, tone, and pace of words     Assessment/Plan:   (R97.20) Elevated prostate specific antigen (PSA)  (primary encounter diagnosis)  Comment: discussed psa elevation  Plan: schedule for prostate MRI next    (R39.9) Lower urinary tract symptoms (LUTS)  Comment: low AUA score.  Plan: UA neg.  PVR zero ml.    "

## 2025-05-21 NOTE — PATIENT INSTRUCTIONS
Please call the [facility] radiology at [phone] to schedule an MRI of the prostate.  Please contact your insurance company to make sure the MRI scan is covered under your insurance plan.   We will follow up with you once these results are available and determine which type of prostate biopsy is most appropriate for you.    PROSTATE BIOPSY  Patient information:  You have had an examination of the prostate gland called a ADOLFO (digital rectal examination) where a finger was inserted into your rectum to enable the doctor to feel your prostate gland. You may also have had a PSA (prostate specific antigen) blood test.  Sometimes an elevated PSA level and an abnormal ADOLFO can be signs of prostate cancer. The doctor has recommended that you have a prostate biopsy.  What is a prostate biopsy:  You will be positioned for the biopsy as preferred by the physician.  An ultrasound probe is inserted into the rectum and a needle is then passed through the wall of the rectum and an injection of local anesthetic is given. Following the injection of local anesthetic, a needle will be inserted into the prostate gland to take a sample of cells. The doctor will usually take 12 separate biopsies.  You can hear some clicking sounds from the biopsy instrument whilst the biopsies are taken.  The procedure will take approximately five to ten minutes.   What are the risks?  Infection: As there is a chance of infection we give you antibiotics before the procedure to reduce the risk. However is you experience a fever, chills, nausea, or just not feeling well up to 72 hours after the biopsy you need to go directly to the emergency room.   Antibiotics: The physician will send a prescription to your preferred pharmacy. You will follow the directions on the bottle. Start the antibiotics on THE MORNING BEFORE THE BIOPSY.   Bleeding: Some men will see blood in the urine, semen and stool.  Bleeding can last for 6-8 weeks intermittently.  If you take any  medication such as aspirin, warfarin, clopidogrel then the risk of bleeding will be higher.   Retention: Some men are unable to pass urine after the biopsy. This is called urine retention. This is very rare.  If you are unable to urinate please call our clinic or go to urgent care or the emergency room.   Getting the results:  The biopsy samples will be sent to a laboratory for examination by a pathologist.  It can take up to one week, sometimes longer, for your doctor to receive the results.  You will be given an appointment to return to clinic for the results of your biopsy within 1-2 weeks. If you do not receive an appointment for the results of the biopsy at the time of the scheduling, please contact the clinic to arrange an appointment for the results.   WE ARE NOT IN ANY CIRCUMSTANCES PERMITTED TO GIVE RESULTS OVER THE PHONE    What to expect following a biopsy:  You are advised to take it easy for the remainder of the day.  You may eat and drink as normal.  No restrictions to your normal daily routine.  As there is a risk of infection you will be given a course of antibiotics.  PLEASE COMPLETE THE FULL COURSE AS INSTRUCTED   Contact information:  Please call the clinic with any further questions [phone]  *Do not leave an urgent message on this voicemail as we may not receive it until the following business day*

## 2025-07-09 ENCOUNTER — HOSPITAL ENCOUNTER (OUTPATIENT)
Dept: CT IMAGING | Facility: CLINIC | Age: 65
Discharge: HOME | End: 2025-07-09
Attending: FAMILY MEDICINE
Payer: MEDICARE

## 2025-07-09 DIAGNOSIS — Z87.891 PERSONAL HISTORY OF TOBACCO USE: ICD-10-CM

## 2025-07-09 DIAGNOSIS — Z72.0 TOBACCO ABUSE DISORDER: ICD-10-CM

## 2025-07-09 PROCEDURE — 71271 CT THORAX LUNG CANCER SCR C-: CPT

## 2025-07-25 ENCOUNTER — RESULTS FOLLOW-UP (OUTPATIENT)
Dept: UROLOGY | Facility: CLINIC | Age: 65
End: 2025-07-25
Payer: MEDICARE

## 2025-07-25 DIAGNOSIS — R97.20 ELEVATED PROSTATE SPECIFIC ANTIGEN (PSA): Primary | ICD-10-CM

## 2025-07-28 RX ORDER — LEVOFLOXACIN 500 MG/1
500 TABLET, FILM COATED ORAL DAILY
Qty: 3 TABLET | Refills: 0 | Status: SHIPPED | OUTPATIENT
Start: 2025-07-28

## 2025-07-28 RX ORDER — DIAZEPAM 10 MG/1
10 TABLET ORAL EVERY 6 HOURS PRN
Qty: 1 TABLET | Refills: 0 | Status: SHIPPED | OUTPATIENT
Start: 2025-07-28

## 2025-07-28 NOTE — TELEPHONE ENCOUNTER
----- Message from Slava Scott sent at 7/25/2025 10:12 AM CDT -----  Recommend MRI fusion prostate biopsy   ----- Message -----  From: Delgado Jean Baptiste Ib  Sent: 7/25/2025   9:15 AM CDT  To: Slava Scott MD

## 2025-07-28 NOTE — TELEPHONE ENCOUNTER
Writer called and spoke with patient about Fusion biopsy procedure scheduling.    Last Prostate MRI date: 7.24.25  Prostate MRI location: [facility]  Lesion #1: Yes PI-RADS: 5    Most recent PSA:   Prostate Specific Antigen Screen   Date Value Ref Range Status   04/29/2025 7.56 (H) 0.00 - 4.50 ng/mL Final     Is the patient diabetic? no   Preferred pharmacy: [facility]  Patient currently on blood thinners? No    Education documentation:  To improve patient experience and health outcomes, patient Received written materials handout and general discussion/verbal explanation on Fusion biopsy. Patient acknowledged understanding on the education.     Appointment scheduling:  The following appointments were scheduled:   Fusion biopsy appointment with [facility]  1 week follow up for OV or VV with Dr. Scott for results.    Prescribing medication (sent to patient's preferred pharmacy):  Antibiotics   No prescriptions requested or ordered in this encounter    Pt was also informed to purchase a Fleets  enema OTC.    Information was sent to patient via [employer].  Pt is interested in a medication for anxiety. Routing to MD for Diazepam.    Mercy Holbrook MA  [facility], Urology   7/28/2025 12:09 PM

## 2025-07-30 ENCOUNTER — OFFICE VISIT (OUTPATIENT)
Dept: UROLOGY | Facility: CLINIC | Age: 65
End: 2025-07-30
Payer: MEDICARE

## 2025-07-30 VITALS
TEMPERATURE: 97.8 F | DIASTOLIC BLOOD PRESSURE: 70 MMHG | SYSTOLIC BLOOD PRESSURE: 122 MMHG | WEIGHT: 274.8 LBS | BODY MASS INDEX: 37.22 KG/M2 | HEIGHT: 72 IN

## 2025-07-30 DIAGNOSIS — R97.20 ELEVATED PROSTATE SPECIFIC ANTIGEN (PSA): Primary | ICD-10-CM

## 2025-07-30 PROCEDURE — 3074F SYST BP LT 130 MM HG: CPT | Performed by: UROLOGY

## 2025-07-30 PROCEDURE — 1126F AMNT PAIN NOTED NONE PRSNT: CPT | Performed by: UROLOGY

## 2025-07-30 PROCEDURE — 3078F DIAST BP <80 MM HG: CPT | Performed by: UROLOGY

## 2025-07-30 PROCEDURE — 99213 OFFICE O/P EST LOW 20 MIN: CPT | Performed by: UROLOGY

## 2025-07-30 RX ORDER — LEVOFLOXACIN 500 MG/1
500 TABLET, FILM COATED ORAL DAILY
Qty: 3 TABLET | Refills: 0 | Status: SHIPPED | OUTPATIENT
Start: 2025-07-30 | End: 2025-07-30

## 2025-07-30 ASSESSMENT — PAIN SCALES - GENERAL: PAINLEVEL_OUTOF10: NO PAIN (0)

## 2025-07-30 NOTE — PROGRESS NOTES
Chief Complaint   Patient presents with    Results     MRI prostate           You Abbott is a 65 year old male who presents today for follow up of   Chief Complaint   Patient presents with    Results     MRI prostate        Follow for elevate psa.  Recent prostate MRI showed PIRADS 5 nodule    Current Outpatient Medications   Medication Sig Dispense Refill    diazepam (VALIUM) 10 MG tablet Take 1 tablet (10 mg) by mouth every 6 hours as needed for anxiety. To take one hour before scheduled biopsy. 1 tablet 0    FLUoxetine (PROZAC) 20 MG capsule Take 1 capsule (20 mg) by mouth daily. 30 capsule 1    insulin pen needle (32G X 4 MM) 32G X 4 MM miscellaneous Use 1 pen needles every 2 weeks 10 each 1    levofloxacin (LEVAQUIN) 500 MG tablet Take 1 tablet (500 mg) by mouth daily. Start one day before biopsy. 3 tablet 0     No Known Allergies   Past Medical History:   Diagnosis Date    Elevated prostate specific antigen (PSA) 09/08/2023    Lipoma of skin and subcutaneous tissue 8/7/2023    Pre-diabetes      Past Surgical History:   Procedure Laterality Date    EXCISE MASS BACK Right 9/6/2023    Procedure: EXCISION, MASS, RIGHT BACK;  Surgeon: Malcolm Gann MD;  Location: [facility]    right knee replacement      TONSILLECTOMY & ADENOIDECTOMY       Family History   Problem Relation Age of Onset    Cancer Mother         lung cancer - smoker    No Known Problems Father     Unknown/Adopted Sister     Unknown/Adopted Sister     Unknown/Adopted Sister      Social History     Socioeconomic History    Marital status: Single     Spouse name: partner - Lisandra    Number of children: None    Years of education: None    Highest education level: None   Tobacco Use    Smoking status: Every Day     Current packs/day: 0.75     Average packs/day: 0.8 packs/day for 45.6 years (34.2 ttl pk-yrs)     Types: Cigarettes     Start date: 1980    Smokeless tobacco: Current     Types: Chew    Tobacco comments:     Used to smoke up to 2 ppd.  Smoking since 20 yo   Vaping Use    Vaping status: Never Used   Substance and Sexual Activity    Alcohol use: Not Currently     Comment: since 3/2025    Drug use: Never    Sexual activity: Yes     Partners: Female     Social Drivers of Health     Financial Resource Strain: Low Risk  (4/28/2025)    Financial Resource Strain     Within the past 12 months, have you or your family members you live with been unable to get utilities (heat, electricity) when it was really needed?: No   Food Insecurity: Low Risk  (4/28/2025)    Food Insecurity     Within the past 12 months, did you worry that your food would run out before you got money to buy more?: No     Within the past 12 months, did the food you bought just not last and you didn t have money to get more?: No   Transportation Needs: Low Risk  (4/28/2025)    Transportation Needs     Within the past 12 months, has lack of transportation kept you from medical appointments, getting your medicines, non-medical meetings or appointments, work, or from getting things that you need?: No   Physical Activity: Inactive (4/28/2025)    Exercise Vital Sign     Days of Exercise per Week: 0 days     Minutes of Exercise per Session: 0 min   Stress: Stress Concern Present (4/28/2025)    [employer] - Occupational Stress Questionnaire     Feeling of Stress : To some extent   Social Connections: Unknown (4/28/2025)    Social Connection and Isolation Panel [NHANES]     Frequency of Social Gatherings with Friends and Family: Never   Interpersonal Safety: Low Risk  (1/31/2024)    Interpersonal Safety     Do you feel physically and emotionally safe where you currently live?: Yes     Within the past 12 months, have you been hit, slapped, kicked or otherwise physically hurt by someone?: No     Within the past 12 months, have you been humiliated or emotionally abused in other ways by your partner or ex-partner?: No   Housing Stability: Low Risk  (4/28/2025)    Housing  "Stability     Do you have housing? : Yes     Are you worried about losing your housing?: No       REVIEW OF SYSTEMS  =================  C: NEGATIVE for fever, chills, change in weight  I: NEGATIVE for worrisome rashes, moles or lesions  E/M: NEGATIVE for ear, mouth and throat problems  R: NEGATIVE for significant cough or SHORTNESS OF BREATH  CV:  NEGATIVE for chest pain, palpitations or peripheral edema  GI: NEGATIVE for nausea, abdominal pain, heartburn, or change in bowel habits  NEURO: NEGATIVE numbness/weakness  : see HPI  PSYCH: NEGATIVE depression/anxiety  LYmph: no new enlarged lymph nodes  Ortho: no new trauma/movements    Physical Exam:  Blood pressure 122/70, temperature 97.8  F (36.6  C), temperature source Temporal, height 1.82 m (5' 11.65\"), weight 124.6 kg (274 lb 12.8 oz).    GENERAL: healthy, alert and no distress  EYES: Eyes grossly normal to inspection, conjunctivae and sclerae normal  RESP: no audible wheeze, cough, or visible cyanosis.  No visible retractions or increased work of breathing.  Able to speak fully in complete sentences.  NEURO: Cranial nerves grossly intact, mentation intact and speech normal  PSYCH: mentation appears normal, affect normal/bright, judgement and insight intact, normal speech and appearance well-groomed    Study Result    Narrative & Impression   MRI PROSTATE: 7/24/2025 10:18 AM     CLINICAL HISTORY: Elevated prostate-specific antigen     Most Recent PSA: 7.56 ug/L     Comparison: None.     TECHNIQUE:  The following sequences were obtained: High-resolution axial  T2-weighted, coronal T2-weighted, 3D volumetric T2-weighted, axial  pre-contrast T1, axial diffusion-weighted, axial apparent diffusion  coefficient and axial dynamic contrast-enhanced T1. Postcontrast  images were evaluated on a separate workstation to evaluate dynamic  contrast enhancement. The technique of this exam is PI-RADS v2.1  compliant. Contrast dose: 10mL Gadavist     FINDINGS:     Prostate " Image Quality (PI-QUAL)     T2-weighted images: 4/4  Diffusion-weighted images: 4/4  Dynamic contrast enhancement images: Positive     PI-QUAL score: 3 - Optimal quality     Size: 24 grams  Hemorrhage: Absent  Peripheral zone: Heterogeneous on T2-weighted images. Suspicious  lesions as detailed below.  Transition zone: Nonenlarged. No suspicious lesions identified.     Lesion(s) in rank order of severity (highest score- to lowest score,  then by size)      Lesion 1:  Location: Right mid gland peripheral zone at the 8 o'clock position  relative to the urethra. Series 4 image 10.   Additional prostate regions involved: None  Size: 20 mm  T2 description: Focal decreased homogeneous signal with ill-defined  margins.  T2 numerical assessment: 5  DWI description: Marked diffusion restriction  DWI numerical assessment: 5  DCE assessment: Positive    Prostate margin: Capsular abutment>15 mm with smooth contour  Lesion overall PI-RADS category: 5     Neurovascular bundles: Lesions approach the neurovascular bundles as  follows:  Right the right-sided lesion approaches the right  neurovascular bundle which may be internal  Seminal vesicles: No seminal vesicle involvement by malignancy.  Lymph nodes: No lymph node involvement  Bones: No suspicious lesions  Other pelvic organs: No additional findings.                                                                      IMPRESSION:  1. Based on the most suspicious abnormality, this exam is  characterized as PIRADS 5 - Clinically significant cancer is highly  likely to be present.  The most suspicious abnormality is located at  the right mid gland peripheral zone and there is long segment capsular  abutment indicating moderate suspicion of minimal extraprostatic  extension.  2. No suspicious adenopathy or evidence of pelvic metastases.        PIRADS? v2.1 Assessment Categories   PIRADS 1: Very low (clinically significant cancer is highly unlikely  to be present)   PIRADS 2: Low  (clinically significant cancer is unlikely to be  present)   PIRADS 3: Intermediate (the presence of clinically significant cancer  is equivocal)   PIRADS 4: High (clinically significant cancer is likely to be present)     PIRADS 5: Very high (clinically significant cancer is highly likely to  be present)     CAROL BENJAMIN MD           Assessment/Plan:   (R97.20) Elevated prostate specific antigen (PSA)  (primary encounter diagnosis)  Comment:    Plan:  prostate MRI fusion biopsy             Patient is anxious.  He will see his primary MD for anxiety.          Please note: Voice recognition software was used in this dictation.  It may therefore contain typographical errors.

## 2025-08-04 ENCOUNTER — OFFICE VISIT (OUTPATIENT)
Dept: FAMILY MEDICINE | Facility: CLINIC | Age: 65
End: 2025-08-04
Payer: MEDICARE

## 2025-08-04 VITALS
DIASTOLIC BLOOD PRESSURE: 80 MMHG | BODY MASS INDEX: 36.7 KG/M2 | OXYGEN SATURATION: 94 % | SYSTOLIC BLOOD PRESSURE: 110 MMHG | RESPIRATION RATE: 12 BRPM | TEMPERATURE: 96.9 F | HEIGHT: 72 IN | HEART RATE: 89 BPM | WEIGHT: 271 LBS

## 2025-08-04 DIAGNOSIS — F41.1 GENERALIZED ANXIETY DISORDER: Primary | ICD-10-CM

## 2025-08-04 DIAGNOSIS — R97.20 ELEVATED PROSTATE SPECIFIC ANTIGEN (PSA): ICD-10-CM

## 2025-08-04 DIAGNOSIS — F33.1 MODERATE EPISODE OF RECURRENT MAJOR DEPRESSIVE DISORDER (H): ICD-10-CM

## 2025-08-04 PROCEDURE — 3074F SYST BP LT 130 MM HG: CPT | Performed by: NURSE PRACTITIONER

## 2025-08-04 PROCEDURE — 99214 OFFICE O/P EST MOD 30 MIN: CPT | Performed by: NURSE PRACTITIONER

## 2025-08-04 PROCEDURE — G2211 COMPLEX E/M VISIT ADD ON: HCPCS | Performed by: NURSE PRACTITIONER

## 2025-08-04 PROCEDURE — 3079F DIAST BP 80-89 MM HG: CPT | Performed by: NURSE PRACTITIONER

## 2025-08-04 RX ORDER — HYDROXYZINE HYDROCHLORIDE 25 MG/1
25-50 TABLET, FILM COATED ORAL EVERY 6 HOURS PRN
Qty: 60 TABLET | Refills: 1 | Status: SHIPPED | OUTPATIENT
Start: 2025-08-04

## 2025-08-04 ASSESSMENT — PATIENT HEALTH QUESTIONNAIRE - PHQ9
SUM OF ALL RESPONSES TO PHQ QUESTIONS 1-9: 0
10. IF YOU CHECKED OFF ANY PROBLEMS, HOW DIFFICULT HAVE THESE PROBLEMS MADE IT FOR YOU TO DO YOUR WORK, TAKE CARE OF THINGS AT HOME, OR GET ALONG WITH OTHER PEOPLE: NOT DIFFICULT AT ALL
SUM OF ALL RESPONSES TO PHQ QUESTIONS 1-9: 0

## 2025-08-04 ASSESSMENT — ENCOUNTER SYMPTOMS: NERVOUS/ANXIOUS: 1

## 2025-08-19 ENCOUNTER — ANCILLARY PROCEDURE (OUTPATIENT)
Dept: ULTRASOUND IMAGING | Facility: CLINIC | Age: 65
End: 2025-08-19
Payer: MEDICARE

## 2025-08-19 ENCOUNTER — OFFICE VISIT (OUTPATIENT)
Dept: UROLOGY | Facility: CLINIC | Age: 65
End: 2025-08-19
Payer: MEDICARE

## 2025-08-19 VITALS — DIASTOLIC BLOOD PRESSURE: 73 MMHG | SYSTOLIC BLOOD PRESSURE: 122 MMHG | OXYGEN SATURATION: 94 % | HEART RATE: 94 BPM

## 2025-08-19 DIAGNOSIS — R97.20 ELEVATED PROSTATE SPECIFIC ANTIGEN (PSA): Primary | ICD-10-CM

## 2025-08-19 DIAGNOSIS — R97.20 ELEVATED PROSTATE SPECIFIC ANTIGEN (PSA): ICD-10-CM

## 2025-08-19 PROCEDURE — 55700 PR BIOPSY OF PROSTATE,NEEDLE/PUNCH: CPT | Performed by: UROLOGY

## 2025-08-19 PROCEDURE — 96372 THER/PROPH/DIAG INJ SC/IM: CPT | Mod: 59 | Performed by: UROLOGY

## 2025-08-19 PROCEDURE — 76942 ECHO GUIDE FOR BIOPSY: CPT | Performed by: UROLOGY

## 2025-08-19 RX ORDER — GENTAMICIN 40 MG/ML
80 INJECTION, SOLUTION INTRAMUSCULAR; INTRAVENOUS ONCE
Status: COMPLETED | OUTPATIENT
Start: 2025-08-19 | End: 2025-08-19

## 2025-08-19 RX ADMIN — GENTAMICIN 80 MG: 40 INJECTION, SOLUTION INTRAMUSCULAR; INTRAVENOUS at 14:36

## 2025-08-20 ENCOUNTER — APPOINTMENT (OUTPATIENT)
Dept: CT IMAGING | Facility: CLINIC | Age: 65
End: 2025-08-20
Attending: STUDENT IN AN ORGANIZED HEALTH CARE EDUCATION/TRAINING PROGRAM
Payer: MEDICARE

## 2025-08-20 ENCOUNTER — HOSPITAL ENCOUNTER (OUTPATIENT)
Facility: CLINIC | Age: 65
Setting detail: OBSERVATION
Discharge: HOME | End: 2025-08-21
Attending: STUDENT IN AN ORGANIZED HEALTH CARE EDUCATION/TRAINING PROGRAM | Admitting: INTERNAL MEDICINE
Payer: MEDICARE

## 2025-08-20 PROBLEM — R79.89 ELEVATED LACTIC ACID LEVEL: Status: ACTIVE | Noted: 2025-08-20

## 2025-08-20 PROBLEM — N41.0 ACUTE PROSTATITIS: Status: ACTIVE | Noted: 2025-08-20

## 2025-08-20 PROCEDURE — 74177 CT ABD & PELVIS W/CONTRAST: CPT

## 2025-08-20 ASSESSMENT — COLUMBIA-SUICIDE SEVERITY RATING SCALE - C-SSRS
1. IN THE PAST MONTH, HAVE YOU WISHED YOU WERE DEAD OR WISHED YOU COULD GO TO SLEEP AND NOT WAKE UP?: NO
6. HAVE YOU EVER DONE ANYTHING, STARTED TO DO ANYTHING, OR PREPARED TO DO ANYTHING TO END YOUR LIFE?: NO
2. HAVE YOU ACTUALLY HAD ANY THOUGHTS OF KILLING YOURSELF IN THE PAST MONTH?: NO

## 2025-08-20 ASSESSMENT — ACTIVITIES OF DAILY LIVING (ADL)
ADLS_ACUITY_SCORE: 41

## 2025-08-21 ENCOUNTER — TELEPHONE (OUTPATIENT)
Dept: INFECTIOUS DISEASES | Facility: CLINIC | Age: 65
End: 2025-08-21
Payer: MEDICARE

## 2025-08-21 ENCOUNTER — HOSPITAL ENCOUNTER (EMERGENCY)
Facility: CLINIC | Age: 65
Discharge: HOME | End: 2025-08-21
Admitting: STUDENT IN AN ORGANIZED HEALTH CARE EDUCATION/TRAINING PROGRAM
Payer: MEDICARE

## 2025-08-21 VITALS
HEART RATE: 113 BPM | OXYGEN SATURATION: 95 % | RESPIRATION RATE: 18 BRPM | DIASTOLIC BLOOD PRESSURE: 96 MMHG | SYSTOLIC BLOOD PRESSURE: 123 MMHG | BODY MASS INDEX: 38.11 KG/M2 | TEMPERATURE: 102.7 F | WEIGHT: 281 LBS

## 2025-08-21 PROBLEM — R78.81 BACTEREMIA DUE TO GRAM-NEGATIVE BACTERIA: Status: ACTIVE | Noted: 2025-08-21

## 2025-08-21 PROBLEM — E87.20 LACTIC ACID ACIDOSIS: Status: ACTIVE | Noted: 2025-08-21

## 2025-08-21 PROCEDURE — 99281 EMR DPT VST MAYX REQ PHY/QHP: CPT

## 2025-08-21 ASSESSMENT — COLUMBIA-SUICIDE SEVERITY RATING SCALE - C-SSRS
1. IN THE PAST MONTH, HAVE YOU WISHED YOU WERE DEAD OR WISHED YOU COULD GO TO SLEEP AND NOT WAKE UP?: NO
2. HAVE YOU ACTUALLY HAD ANY THOUGHTS OF KILLING YOURSELF IN THE PAST MONTH?: NO
6. HAVE YOU EVER DONE ANYTHING, STARTED TO DO ANYTHING, OR PREPARED TO DO ANYTHING TO END YOUR LIFE?: NO

## 2025-08-21 ASSESSMENT — ACTIVITIES OF DAILY LIVING (ADL)
DRESSING/BATHING_DIFFICULTY: NO
WALKING_OR_CLIMBING_STAIRS_DIFFICULTY: NO
HEARING_DIFFICULTY_OR_DEAF: NO
ADLS_ACUITY_SCORE: 21
WEAR_GLASSES_OR_BLIND: NO
ADLS_ACUITY_SCORE: 16
DIFFICULTY_EATING/SWALLOWING: NO
DIFFICULTY_COMMUNICATING: NO
ADLS_ACUITY_SCORE: 16
CHANGE_IN_FUNCTIONAL_STATUS_SINCE_ONSET_OF_CURRENT_ILLNESS/INJURY: NO
DOING_ERRANDS_INDEPENDENTLY_DIFFICULTY: NO
CONCENTRATING,_REMEMBERING_OR_MAKING_DECISIONS_DIFFICULTY: NO
FALL_HISTORY_WITHIN_LAST_SIX_MONTHS: NO
ADLS_ACUITY_SCORE: 15
ADLS_ACUITY_SCORE: 15
ADLS_ACUITY_SCORE: 16
ADLS_ACUITY_SCORE: 21
TOILETING_ISSUES: NO
ADLS_ACUITY_SCORE: 16
ADLS_ACUITY_SCORE: 16

## 2025-08-22 ENCOUNTER — MYC MEDICAL ADVICE (OUTPATIENT)
Dept: UROLOGY | Facility: CLINIC | Age: 65
End: 2025-08-22
Payer: MEDICARE

## 2025-08-22 ENCOUNTER — TELEPHONE (OUTPATIENT)
Dept: INFECTIOUS DISEASES | Facility: CLINIC | Age: 65
End: 2025-08-22
Payer: MEDICARE

## 2025-08-22 DIAGNOSIS — R78.81 BACTEREMIA DUE TO GRAM-NEGATIVE BACTERIA: Primary | ICD-10-CM

## 2025-08-22 RX ORDER — SULFAMETHOXAZOLE AND TRIMETHOPRIM 800; 160 MG/1; MG/1
2 TABLET ORAL 2 TIMES DAILY
Qty: 28 TABLET | Refills: 0 | Status: SHIPPED | OUTPATIENT
Start: 2025-08-22 | End: 2025-08-29

## 2025-08-26 LAB
PATH REPORT.COMMENTS IMP SPEC: ABNORMAL
PATH REPORT.COMMENTS IMP SPEC: ABNORMAL
PATH REPORT.COMMENTS IMP SPEC: YES
PATH REPORT.FINAL DX SPEC: ABNORMAL
PATH REPORT.GROSS SPEC: ABNORMAL
PATH REPORT.MICROSCOPIC SPEC OTHER STN: ABNORMAL
PATH REPORT.RELEVANT HX SPEC: ABNORMAL
PHOTO IMAGE: ABNORMAL

## 2025-08-27 ENCOUNTER — OFFICE VISIT (OUTPATIENT)
Dept: UROLOGY | Facility: CLINIC | Age: 65
End: 2025-08-27
Payer: MEDICARE

## 2025-08-27 VITALS
SYSTOLIC BLOOD PRESSURE: 128 MMHG | WEIGHT: 281 LBS | HEIGHT: 72 IN | TEMPERATURE: 96.5 F | DIASTOLIC BLOOD PRESSURE: 76 MMHG | BODY MASS INDEX: 38.06 KG/M2

## 2025-08-27 DIAGNOSIS — C61 PROSTATE CANCER (H): Primary | ICD-10-CM

## 2025-08-27 DIAGNOSIS — N41.0 ACUTE PROSTATITIS: ICD-10-CM

## 2025-08-27 PROCEDURE — 1111F DSCHRG MED/CURRENT MED MERGE: CPT | Performed by: UROLOGY

## 2025-08-27 PROCEDURE — 1126F AMNT PAIN NOTED NONE PRSNT: CPT | Performed by: UROLOGY

## 2025-08-27 PROCEDURE — 3074F SYST BP LT 130 MM HG: CPT | Performed by: UROLOGY

## 2025-08-27 PROCEDURE — 3078F DIAST BP <80 MM HG: CPT | Performed by: UROLOGY

## 2025-08-27 PROCEDURE — 99214 OFFICE O/P EST MOD 30 MIN: CPT | Performed by: UROLOGY

## 2025-08-27 ASSESSMENT — PAIN SCALES - GENERAL: PAINLEVEL_OUTOF10: NO PAIN (0)

## 2025-09-03 ENCOUNTER — ANCILLARY PROCEDURE (OUTPATIENT)
Dept: NUCLEAR MEDICINE | Facility: CLINIC | Age: 65
End: 2025-09-03
Attending: UROLOGY
Payer: MEDICARE

## 2025-09-03 DIAGNOSIS — C61 PROSTATE CANCER (H): ICD-10-CM

## 2025-09-03 PROCEDURE — 78306 BONE IMAGING WHOLE BODY: CPT | Mod: GC | Performed by: RADIOLOGY

## 2025-09-03 PROCEDURE — A9503 TC99M MEDRONATE: HCPCS | Performed by: RADIOLOGY

## 2025-09-03 RX ORDER — TC 99M MEDRONATE 20 MG/10ML
27.4 INJECTION, POWDER, LYOPHILIZED, FOR SOLUTION INTRAVENOUS ONCE
Status: COMPLETED | OUTPATIENT
Start: 2025-09-03 | End: 2025-09-03

## 2025-09-03 RX ADMIN — TC 99M MEDRONATE 27.4 MILLICURIE: 20 INJECTION, POWDER, LYOPHILIZED, FOR SOLUTION INTRAVENOUS at 11:10

## (undated) DEVICE — SYR BULB IRRIG DOVER 60 ML LATEX FREE 67000

## (undated) DEVICE — ESU ELEC BLADE HEX-LOCKING 2.5" E1450X

## (undated) DEVICE — PACK MINOR PROCEDURE CUSTOM

## (undated) DEVICE — SU MONOCRYL 4-0 PS-2 18" UND Y496G

## (undated) DEVICE — SUCTION MANIFOLD NEPTUNE 2 SYS 4 PORT 0702-020-000

## (undated) DEVICE — SU DERMABOND ADVANCED .7ML DNX12

## (undated) DEVICE — DRAPE LAP TRANSVERSE 29421

## (undated) DEVICE — SOL NACL 0.9% IRRIG 1000ML BOTTLE 07138-09

## (undated) DEVICE — SU VICRYL 3-0 SH 27" UND J416H

## (undated) DEVICE — PREP CHLORAPREP 26ML TINTED ORANGE  260815

## (undated) DEVICE — GLOVE BIOGEL PI ULTRATOUCH G SZ 7.5 42175

## (undated) RX ORDER — HYDROMORPHONE HYDROCHLORIDE 1 MG/ML
INJECTION, SOLUTION INTRAMUSCULAR; INTRAVENOUS; SUBCUTANEOUS
Status: DISPENSED
Start: 2023-09-06

## (undated) RX ORDER — FENTANYL CITRATE 50 UG/ML
INJECTION, SOLUTION INTRAMUSCULAR; INTRAVENOUS
Status: DISPENSED
Start: 2023-09-06

## (undated) RX ORDER — PROPOFOL 10 MG/ML
INJECTION, EMULSION INTRAVENOUS
Status: DISPENSED
Start: 2023-09-06

## (undated) RX ORDER — BUPIVACAINE HYDROCHLORIDE AND EPINEPHRINE 2.5; 5 MG/ML; UG/ML
INJECTION, SOLUTION EPIDURAL; INFILTRATION; INTRACAUDAL; PERINEURAL
Status: DISPENSED
Start: 2023-09-06

## (undated) RX ORDER — DEXAMETHASONE SODIUM PHOSPHATE 10 MG/ML
INJECTION, SOLUTION INTRAMUSCULAR; INTRAVENOUS
Status: DISPENSED
Start: 2023-09-06

## (undated) RX ORDER — KETOROLAC TROMETHAMINE 30 MG/ML
INJECTION, SOLUTION INTRAMUSCULAR; INTRAVENOUS
Status: DISPENSED
Start: 2023-09-06

## (undated) RX ORDER — ONDANSETRON 2 MG/ML
INJECTION INTRAMUSCULAR; INTRAVENOUS
Status: DISPENSED
Start: 2023-09-06